# Patient Record
Sex: FEMALE | Race: WHITE | NOT HISPANIC OR LATINO | Employment: FULL TIME | URBAN - METROPOLITAN AREA
[De-identification: names, ages, dates, MRNs, and addresses within clinical notes are randomized per-mention and may not be internally consistent; named-entity substitution may affect disease eponyms.]

---

## 2020-03-12 LAB
LEFT EYE DM RETINOPATHY: NEGATIVE
RIGHT EYE DM RETINOPATHY: NEGATIVE

## 2020-09-10 ENCOUNTER — OFFICE VISIT (OUTPATIENT)
Dept: FAMILY MEDICINE | Facility: CLINIC | Age: 55
End: 2020-09-10
Payer: COMMERCIAL

## 2020-09-10 VITALS
DIASTOLIC BLOOD PRESSURE: 84 MMHG | SYSTOLIC BLOOD PRESSURE: 156 MMHG | OXYGEN SATURATION: 96 % | RESPIRATION RATE: 14 BRPM | HEART RATE: 59 BPM | HEIGHT: 65 IN | TEMPERATURE: 97 F | WEIGHT: 187.19 LBS | BODY MASS INDEX: 31.19 KG/M2

## 2020-09-10 DIAGNOSIS — E03.9 HYPOTHYROIDISM (ACQUIRED): ICD-10-CM

## 2020-09-10 DIAGNOSIS — M15.8 OTHER OSTEOARTHRITIS INVOLVING MULTIPLE JOINTS: ICD-10-CM

## 2020-09-10 DIAGNOSIS — E55.9 VITAMIN D DEFICIENCY: ICD-10-CM

## 2020-09-10 DIAGNOSIS — Z00.00 ADULT GENERAL MEDICAL EXAM: Primary | ICD-10-CM

## 2020-09-10 DIAGNOSIS — I10 ESSENTIAL HYPERTENSION: ICD-10-CM

## 2020-09-10 DIAGNOSIS — N95.1 MENOPAUSAL SYNDROME: ICD-10-CM

## 2020-09-10 DIAGNOSIS — E11.42 DIABETIC PERIPHERAL NEUROPATHY: ICD-10-CM

## 2020-09-10 DIAGNOSIS — M79.7 FIBROMYALGIA SYNDROME: ICD-10-CM

## 2020-09-10 PROCEDURE — 3008F PR BODY MASS INDEX (BMI) DOCUMENTED: ICD-10-PCS | Mod: S$GLB,,, | Performed by: FAMILY MEDICINE

## 2020-09-10 PROCEDURE — 99205 PR OFFICE/OUTPT VISIT, NEW, LEVL V, 60-74 MIN: ICD-10-PCS | Mod: S$GLB,,, | Performed by: FAMILY MEDICINE

## 2020-09-10 PROCEDURE — 3008F BODY MASS INDEX DOCD: CPT | Mod: S$GLB,,, | Performed by: FAMILY MEDICINE

## 2020-09-10 PROCEDURE — 99999 PR PBB SHADOW E&M-NEW PATIENT-LVL IV: CPT | Mod: PBBFAC,,, | Performed by: FAMILY MEDICINE

## 2020-09-10 PROCEDURE — 99999 PR PBB SHADOW E&M-NEW PATIENT-LVL IV: ICD-10-PCS | Mod: PBBFAC,,, | Performed by: FAMILY MEDICINE

## 2020-09-10 PROCEDURE — 99205 OFFICE O/P NEW HI 60 MIN: CPT | Mod: S$GLB,,, | Performed by: FAMILY MEDICINE

## 2020-09-10 RX ORDER — INSULIN ASPART 100 [IU]/ML
INJECTION, SOLUTION INTRAVENOUS; SUBCUTANEOUS
Qty: 5 VIAL | Refills: 2 | Status: SHIPPED | OUTPATIENT
Start: 2020-09-10 | End: 2020-10-02 | Stop reason: SDUPTHER

## 2020-09-10 RX ORDER — LISINOPRIL 30 MG/1
40 TABLET ORAL DAILY
COMMUNITY
End: 2020-10-15

## 2020-09-10 NOTE — PATIENT INSTRUCTIONS
Start ALA 600mg daily  Start Nature made ULTRA Omega Fish Oil daily (2 capsules)   Current regimen: Dilaudid 0.2mg Q2hrs PRN. Has received 4 PRNs in last 12 hrs. Current regimen: Dilaudid 0.2mg Q2hrs PRN. Has received 4 PRNs in last 12 hrs.  Would recommend to start Dilaudid 0.2mg Q4hrs atc, and start Dilaudid 0.5mg Q3hrs PRN for dyspnea.

## 2020-09-10 NOTE — PROGRESS NOTES
Subjective:       Patient ID: Geovanna Loo is a 55 y.o. female.    Chief Complaint: Establish Care (Diabetic management issues)    New patient.    Dx with diabetes about 13 years ago. Started on metformin, but that did not help. Failed Jardiance, Januvia, Trulicity in [place]. Moved to [facility] and started seeing a NP in endocrinologist. Dx with IDDM Type 1.5 and started on insulin. Gained 17 pounds in two month on Novolog (insulin pump.)  Changed to Humalog (insulin pump.) Reports Novolog controlled her sugars better but she does not want to gain any more weight.    Works at [employer].     Current insulin: Humalog 6u at mealtime (3 clicks); basal rate unknown (Patient will get that info to us.)    Lisinopril 40mg daily.  Levothyroxine 75mcg daily  Vitamin D 2,000 IU daily  Citalopram 20mg at night for hot flashes  Singulair 10mg at night  Tramadol 50mg prn pain  Skelaxin 800mg prn pain  Lasix 10mg prn swelling.    Fibromyalgia, OA.        Diabetes  She presents for her initial diabetic visit. She has type 1 diabetes mellitus. Her disease course has been fluctuating. There are no hypoglycemic associated symptoms. Pertinent negatives for hypoglycemia include no headaches. Associated symptoms include fatigue and polydipsia. Pertinent negatives for diabetes include no blurred vision, no chest pain, no foot paresthesias, no foot ulcerations, no polyphagia, no polyuria, no visual change, no weakness and no weight loss. There are no hypoglycemic complications. Symptoms are stable. Risk factors for coronary artery disease include diabetes mellitus, dyslipidemia, obesity, hypertension and stress. Current diabetic treatment includes insulin pump. She is compliant with treatment all of the time. Her weight is stable. She is following a low fat/cholesterol diet. Meal planning includes avoidance of concentrated sweets. She has not had a previous visit with a dietitian. She rarely participates in exercise. Her home blood glucose  trend is fluctuating minimally. An ACE inhibitor/angiotensin II receptor blocker is being taken. She does not see a podiatrist.Eye exam is current.     Review of Systems   Constitutional: Positive for fatigue. Negative for activity change, appetite change, unexpected weight change and weight loss.   HENT: Negative for postnasal drip, sinus pressure, sinus pain and sore throat.    Eyes: Negative for blurred vision.   Cardiovascular: Negative for chest pain.   Gastrointestinal: Negative for constipation, diarrhea, nausea and vomiting.   Endocrine: Positive for polydipsia. Negative for polyphagia and polyuria.   Genitourinary: Negative for dysuria and hematuria.   Musculoskeletal: Positive for arthralgias. Negative for gait problem.   Neurological: Positive for numbness (peripheral neuropathy). Negative for weakness, light-headedness and headaches.   Hematological: Negative for adenopathy. Does not bruise/bleed easily.   All other systems reviewed and are negative.        Past Medical History:   Diagnosis Date    Diabetes mellitus type I      Past Surgical History:   Procedure Laterality Date     SECTION      HYSTERECTOMY       Family History   Problem Relation Age of Onset    Cancer Mother     Thyroid disease Mother     Thyroid disease Sister     Thyroid disease Brother        Review of patient's allergies indicates:   Allergen Reactions    Basaglar kwikpen u-100 insulin [insulin glargine] Swelling     THROAT SWELLS       Current Outpatient Medications:     lisinopriL (PRINIVIL,ZESTRIL) 30 MG tablet, Take 30 mg by mouth once daily., Disp: , Rfl:     citalopram (CELEXA) 20 MG tablet, Take 20 mg by mouth once daily. One tablet daily at night, Disp: , Rfl:     ergocalciferol, vitamin D2, (VITAMIN D2 ORAL), Take 50 mg by mouth Daily., Disp: , Rfl:     furosemide (LASIX) 20 MG tablet, Take 10 mg by mouth daily as needed., Disp: , Rfl:     insulin aspart U-100 (NOVOLOG U-100 INSULIN ASPART) 100 unit/mL  "injection, Use as directed with insulin pump (V GO 30.), Disp: 5 vial, Rfl: 2    levothyroxine (SYNTHROID) 75 MCG tablet, Take 75 mcg by mouth before breakfast., Disp: , Rfl:     metaxalone (SKELAXIN) 800 MG tablet, Take 800 mg by mouth as needed for Pain., Disp: , Rfl:     traMADoL (ULTRAM) 50 mg tablet, Take 50 mg by mouth as needed for Pain., Disp: , Rfl:       Objective:      BP (!) 156/84 (BP Location: Left arm, Patient Position: Sitting, BP Method: Large (Automatic))   Pulse (!) 59   Temp 97.4 °F (36.3 °C) (Temporal)   Resp 14   Ht 5' 5" (1.651 m)   Wt 84.9 kg (187 lb 2.7 oz)   SpO2 96%   BMI 31.15 kg/m²   Physical Exam  Vitals signs and nursing note reviewed.   Constitutional:       General: She is not in acute distress.     Appearance: Normal appearance. She is well-developed. She is obese. She is not ill-appearing, toxic-appearing or diaphoretic.   HENT:      Head: Normocephalic and atraumatic.      Right Ear: External ear normal.      Left Ear: External ear normal.      Nose: Nose normal. No congestion.      Mouth/Throat:      Mouth: Mucous membranes are moist.      Pharynx: Oropharynx is clear. No oropharyngeal exudate.   Eyes:      General: No scleral icterus.        Right eye: No discharge.         Left eye: No discharge.      Extraocular Movements: Extraocular movements intact.      Conjunctiva/sclera: Conjunctivae normal.      Pupils: Pupils are equal, round, and reactive to light.   Neck:      Musculoskeletal: Normal range of motion and neck supple. No neck rigidity or muscular tenderness.      Thyroid: No thyromegaly.      Vascular: No carotid bruit or JVD.      Trachea: No tracheal deviation.   Cardiovascular:      Rate and Rhythm: Normal rate and regular rhythm.      Pulses: Normal pulses.      Heart sounds: Normal heart sounds. No murmur. No friction rub. No gallop.    Pulmonary:      Effort: Pulmonary effort is normal. No respiratory distress.      Breath sounds: Normal breath sounds. " No wheezing, rhonchi or rales.   Chest:      Chest wall: No tenderness.   Abdominal:      General: Bowel sounds are normal. There is no distension.      Palpations: Abdomen is soft. There is no mass.      Tenderness: There is no abdominal tenderness. There is no right CVA tenderness, left CVA tenderness, guarding or rebound.   Musculoskeletal: Normal range of motion.      Right lower leg: No edema.      Left lower leg: No edema.   Lymphadenopathy:      Cervical: No cervical adenopathy.   Skin:     General: Skin is warm and dry.      Capillary Refill: Capillary refill takes less than 2 seconds.      Coloration: Skin is not jaundiced or pale.      Findings: No bruising, erythema or rash.   Neurological:      General: No focal deficit present.      Mental Status: She is alert and oriented to person, place, and time. Mental status is at baseline.      Motor: No weakness.      Coordination: Coordination normal.      Gait: Gait normal.      Deep Tendon Reflexes: Reflexes normal.   Psychiatric:         Mood and Affect: Mood normal.         Behavior: Behavior normal.         Thought Content: Thought content normal.         Judgment: Judgment normal.         Assessment:       1. Adult general medical exam    2. Uncontrolled insulin-dependent diabetes mellitus with neuropathy    3. Essential hypertension    4. Other osteoarthritis involving multiple joints    5. Fibromyalgia syndrome    6. Menopausal syndrome    7. Vitamin D deficiency    8. 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 Diabetic peripheral neuropathy    10. Hypothyroidism (acquired)        Plan:       Adult general medical exam    Uncontrolled insulin-dependent diabetes mellitus with neuropathy  -     CBC auto differential; Future; Expected date: 09/10/2020  -     Comprehensive metabolic panel; Future; Expected date: 09/10/2020  -     Hemoglobin A1C; Future; Expected date: 09/10/2020  -     TSH; Future; Expected date: 09/10/2020  -     Vitamin  B12; Future; Expected date: 09/10/2020  -     Vitamin D; Future; Expected date: 09/10/2020  -     Glutamic acid decarboxylase; Future; Expected date: 09/10/2020  -     Anti-islet cell antibody; Future; Expected date: 09/10/2020  -     Urinalysis Microscopic; Future; Expected date: 09/10/2020  -     Microalbumin/creatinine urine ratio; Future; Expected date: 09/10/2020  -     Ambulatory referral/consult to Endocrinology; Future; Expected date: 09/17/2020    Essential hypertension  -     CBC auto differential; Future; Expected date: 09/10/2020  -     Comprehensive metabolic panel; Future; Expected date: 09/10/2020  -     TSH; Future; Expected date: 09/10/2020    Other osteoarthritis involving multiple joints    Fibromyalgia syndrome    Menopausal syndrome  -     TSH; Future; Expected date: 09/10/2020  -     Vitamin B12; Future; Expected date: 09/10/2020  -     Vitamin D; Future; Expected date: 09/10/2020    Vitamin D deficiency  -     Vitamin D; Future; Expected date: 09/10/2020    Diabetes, 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, uncontrolled, managed as type 1  -     CBC auto differential; Future; Expected date: 09/10/2020  -     Comprehensive metabolic panel; Future; Expected date: 09/10/2020  -     Hemoglobin A1C; Future; Expected date: 09/10/2020  -     TSH; Future; Expected date: 09/10/2020  -     Vitamin B12; Future; Expected date: 09/10/2020  -     Vitamin D; Future; Expected date: 09/10/2020  -     Glutamic acid decarboxylase; Future; Expected date: 09/10/2020  -     Anti-islet cell antibody; Future; Expected date: 09/10/2020  -     Urinalysis Microscopic; Future; Expected date: 09/10/2020  -     Microalbumin/creatinine urine ratio; Future; Expected date: 09/10/2020  -     Ambulatory referral/consult to Endocrinology; Future; Expected date: 09/17/2020  -     insulin aspart U-100 (NOVOLOG U-100 INSULIN ASPART) 100 unit/mL injection; Use as directed with insulin pump (V GO 30.)  Dispense: 5 vial; Refill: 2    Diabetic peripheral  neuropathy  -     CBC auto differential; Future; Expected date: 09/10/2020  -     Comprehensive metabolic panel; Future; Expected date: 09/10/2020  -     Hemoglobin A1C; Future; Expected date: 09/10/2020  -     TSH; Future; Expected date: 09/10/2020  -     Vitamin B12; Future; Expected date: 09/10/2020  -     Vitamin D; Future; Expected date: 09/10/2020  -     Glutamic acid decarboxylase; Future; Expected date: 09/10/2020  -     Anti-islet cell antibody; Future; Expected date: 09/10/2020  -     Urinalysis Microscopic; Future; Expected date: 09/10/2020  -     Microalbumin/creatinine urine ratio; Future; Expected date: 09/10/2020  -     Ambulatory referral/consult to Endocrinology; Future; Expected date: 09/17/2020  -     insulin aspart U-100 (NOVOLOG U-100 INSULIN ASPART) 100 unit/mL injection; Use as directed with insulin pump (V GO 30.)  Dispense: 5 vial; Refill: 2    Hypothyroidism (acquired)  -     TSH, free T4, free T3; Future; Expected date: 09/10/2020    Sign for records.     I would recommend she return to Novolog which she tolerated better, as the weight gain likely has leveled off. Tight record of FSG and RTC for review. No insulin change today. Clarify basal rate.    Strict return precautions reviewed and patient verbalized understanding. Risks, benefits, and alternatives to the plan were reviewed in detail and all questions answered to the patient's satisfaction. 60 minutes were spent with patient, >50% of time based on counseling and coordination of care.          Follow up in about 2 weeks (around 9/24/2020) for DM.

## 2020-09-21 ENCOUNTER — OFFICE VISIT (OUTPATIENT)
Dept: FAMILY MEDICINE | Facility: CLINIC | Age: 55
End: 2020-09-21
Payer: COMMERCIAL

## 2020-09-21 VITALS
BODY MASS INDEX: 31.68 KG/M2 | TEMPERATURE: 98 F | WEIGHT: 190.13 LBS | DIASTOLIC BLOOD PRESSURE: 86 MMHG | RESPIRATION RATE: 18 BRPM | SYSTOLIC BLOOD PRESSURE: 152 MMHG | OXYGEN SATURATION: 97 % | HEART RATE: 54 BPM | HEIGHT: 65 IN

## 2020-09-21 DIAGNOSIS — E11.42 DIABETIC PERIPHERAL NEUROPATHY: ICD-10-CM

## 2020-09-21 DIAGNOSIS — G44.229 CHRONIC TENSION-TYPE HEADACHE, NOT INTRACTABLE: ICD-10-CM

## 2020-09-21 DIAGNOSIS — E13.9 DIABETES MELLITUS TYPE 1.5, MANAGED AS TYPE 1: Primary | ICD-10-CM

## 2020-09-21 DIAGNOSIS — R00.2 PALPITATIONS: ICD-10-CM

## 2020-09-21 DIAGNOSIS — E03.9 ACQUIRED HYPOTHYROIDISM: ICD-10-CM

## 2020-09-21 DIAGNOSIS — T43.615A ADVERSE EFFECT OF CAFFEINE, INITIAL ENCOUNTER: ICD-10-CM

## 2020-09-21 PROCEDURE — 99999 PR PBB SHADOW E&M-EST. PATIENT-LVL V: ICD-10-PCS | Mod: PBBFAC,,, | Performed by: FAMILY MEDICINE

## 2020-09-21 PROCEDURE — 99215 OFFICE O/P EST HI 40 MIN: CPT | Mod: S$GLB,,, | Performed by: FAMILY MEDICINE

## 2020-09-21 PROCEDURE — 3008F BODY MASS INDEX DOCD: CPT | Mod: S$GLB,,, | Performed by: FAMILY MEDICINE

## 2020-09-21 PROCEDURE — 3008F PR BODY MASS INDEX (BMI) DOCUMENTED: ICD-10-PCS | Mod: S$GLB,,, | Performed by: FAMILY MEDICINE

## 2020-09-21 PROCEDURE — 99999 PR PBB SHADOW E&M-EST. PATIENT-LVL V: CPT | Mod: PBBFAC,,, | Performed by: FAMILY MEDICINE

## 2020-09-21 PROCEDURE — 99215 PR OFFICE/OUTPT VISIT, EST, LEVL V, 40-54 MIN: ICD-10-PCS | Mod: S$GLB,,, | Performed by: FAMILY MEDICINE

## 2020-09-21 RX ORDER — METAXALONE 800 MG/1
800 TABLET ORAL
COMMUNITY
End: 2020-12-10

## 2020-09-21 RX ORDER — FUROSEMIDE 20 MG/1
10 TABLET ORAL DAILY PRN
COMMUNITY

## 2020-09-21 RX ORDER — TRAMADOL HYDROCHLORIDE 50 MG/1
50 TABLET ORAL
COMMUNITY

## 2020-09-21 RX ORDER — LEVOTHYROXINE SODIUM 75 UG/1
75 TABLET ORAL
COMMUNITY

## 2020-09-21 RX ORDER — CITALOPRAM 20 MG/1
20 TABLET, FILM COATED ORAL DAILY
COMMUNITY
End: 2020-10-19 | Stop reason: SDUPTHER

## 2020-09-21 NOTE — PROGRESS NOTES
Subjective:       Patient ID: Geovanna Loo is a 55 y.o. female.    Chief Complaint: Follow-up    Here for follow up on uncontrolled Type 1.5 IDDM. Blood sugars seem better since she has changed her diet, lowering her carbohydrate intake and avoiding bread, sweets. Also states the Novolog has done a better job at keeping blood glucose level stable.    Hx of palpitations and chest pressure; TSH was suppressed and her levothyroxine dose was lowered. She has not rechecked since her dose was changed.     Headaches, chronic. Denies any neurological or visual changes. Excedrin Migraine every morning. Other caffeine during the day--one can of Monster coffee which she drinks throughout the course of the day.    Patient never related the chest pressure and palpitations to caffeine excess. Not known when her last EKG was done. Not sure if she ever had one.        Review of Systems   All other systems reviewed and are negative.        Past Medical History:   Diagnosis Date    Diabetes mellitus type I      Past Surgical History:   Procedure Laterality Date     SECTION      HYSTERECTOMY       Family History   Problem Relation Age of Onset    Cancer Mother     Thyroid disease Mother     Thyroid disease Sister     Thyroid disease Brother        Review of patient's allergies indicates:   Allergen Reactions    Basaglar kwikpen u-100 insulin [insulin glargine] Swelling     THROAT SWELLS    Aspirin Nausea And Vomiting       Current Outpatient Medications:     citalopram (CELEXA) 20 MG tablet, Take 20 mg by mouth once daily. One tablet daily at night, Disp: , Rfl:     ergocalciferol, vitamin D2, (VITAMIN D2 ORAL), Take 50 mg by mouth Daily., Disp: , Rfl:     furosemide (LASIX) 20 MG tablet, Take 10 mg by mouth daily as needed., Disp: , Rfl:     levothyroxine (SYNTHROID) 75 MCG tablet, Take 75 mcg by mouth before breakfast., Disp: , Rfl:     metaxalone (SKELAXIN) 800 MG tablet, Take 800 mg by mouth as needed for  "Pain., Disp: , Rfl:     traMADoL (ULTRAM) 50 mg tablet, Take 50 mg by mouth as needed for Pain., Disp: , Rfl:     insulin aspart U-100 (NOVOLOG U-100 INSULIN ASPART) 100 unit/mL injection, Use as directed with insulin pump (V GO 30.), Disp: 5 vial, Rfl: 2    insulin syringe-needle U-100 (INSULIN SYRINGE) 0.5 mL 29 gauge x 1/2" Syrg, Use as directed for insulin administration., Disp: 100 each, Rfl: 5    lisinopriL (PRINIVIL,ZESTRIL) 30 MG tablet, Take 30 mg by mouth once daily., Disp: , Rfl:       Objective:      BP (!) 152/86 (BP Location: Left arm, Patient Position: Sitting, BP Method: Medium (Automatic))   Pulse (!) 54   Temp 97.6 °F (36.4 °C) (Temporal)   Resp 18   Ht 5' 5" (1.651 m)   Wt 86.3 kg (190 lb 2.4 oz)   SpO2 97%   BMI 31.64 kg/m²   Physical Exam  Vitals signs and nursing note reviewed.   Constitutional:       General: She is not in acute distress.     Appearance: Normal appearance. She is well-developed. She is obese. She is not ill-appearing, toxic-appearing or diaphoretic.   HENT:      Head: Normocephalic and atraumatic.      Right Ear: External ear normal.      Left Ear: External ear normal.      Nose: Nose normal. No congestion.      Mouth/Throat:      Mouth: Mucous membranes are moist.      Pharynx: Oropharynx is clear. No oropharyngeal exudate.   Eyes:      General: No scleral icterus.        Right eye: No discharge.         Left eye: No discharge.      Extraocular Movements: Extraocular movements intact.      Conjunctiva/sclera: Conjunctivae normal.      Pupils: Pupils are equal, round, and reactive to light.   Neck:      Musculoskeletal: Normal range of motion and neck supple. No neck rigidity or muscular tenderness.      Thyroid: No thyromegaly.      Vascular: No carotid bruit or JVD.      Trachea: No tracheal deviation.   Cardiovascular:      Rate and Rhythm: Normal rate and regular rhythm.      Pulses: Normal pulses.      Heart sounds: Normal heart sounds. No murmur. No friction " rub. No gallop.    Pulmonary:      Effort: Pulmonary effort is normal. No respiratory distress.      Breath sounds: Normal breath sounds. No wheezing, rhonchi or rales.   Chest:      Chest wall: No tenderness.   Abdominal:      General: Bowel sounds are normal. There is no distension.      Palpations: Abdomen is soft. There is no mass.      Tenderness: There is no abdominal tenderness. There is no right CVA tenderness, left CVA tenderness, guarding or rebound.   Musculoskeletal: Normal range of motion.      Right lower leg: No edema.      Left lower leg: No edema.   Lymphadenopathy:      Cervical: No cervical adenopathy.   Skin:     General: Skin is warm and dry.      Capillary Refill: Capillary refill takes less than 2 seconds.      Coloration: Skin is not jaundiced or pale.      Findings: No bruising, erythema or rash.   Neurological:      General: No focal deficit present.      Mental Status: She is alert and oriented to person, place, and time. Mental status is at baseline.      Motor: No weakness.      Coordination: Coordination normal.      Gait: Gait normal.   Psychiatric:         Mood and Affect: Mood normal.         Behavior: Behavior normal.         Thought Content: Thought content normal.         Judgment: Judgment normal.         Assessment:       1. Uncontrolled insulin-dependent diabetes mellitus with neuropathy    2. Acquired hypothyroidism    3. Palpitations    4. Adverse effect of caffeine, initial encounter    5. Chronic tension-type headache, not intractable        Plan:       Uncontrolled insulin-dependent diabetes mellitus with neuropathy  -     Lipid Panel; Future; Expected date: 09/21/2020  -     Ambulatory referral/consult to Endocrinology; Future; Expected date: 09/28/2020    Acquired hypothyroidism  -     T4, free; Future; Expected date: 09/21/2020  -     T3, free; Future; Expected date: 09/21/2020  -     Ambulatory referral/consult to Endocrinology; Future; Expected date:  09/28/2020    Palpitations  -     IN OFFICE EKG 12-LEAD (to Muse); Future; Expected date: 09/21/2020    Adverse effect of caffeine, initial encounter    Chronic tension-type headache, not intractable    Get labs, as only TSH was done prior to today's visit. Endo referral within [facility] network. Continue with dietary changes and Novolog.      Strict return precautions reviewed and patient verbalized understanding. Risks, benefits, and alternatives to the plan were reviewed in detail and all questions answered to the patient's satisfaction. 40 minutes were spent with patient, >50% of time based on counseling and coordination of care.    Follow up in about 4 weeks (around 10/19/2020) for DM and palpitations.

## 2020-09-21 NOTE — PATIENT INSTRUCTIONS
[employer] chocolate    Educate yourself on WHICH carbs and sugar alcohols/sugar substitutes will affect your glucose level.    LEV    [employer] ZeroCarb Bread    Ezekial bread (sprouted grain bread)    Grain free granola bar

## 2020-09-25 ENCOUNTER — TELEPHONE (OUTPATIENT)
Dept: FAMILY MEDICINE | Facility: CLINIC | Age: 55
End: 2020-09-25

## 2020-09-25 DIAGNOSIS — E11.9 TYPE 2 DIABETES MELLITUS WITHOUT COMPLICATION, UNSPECIFIED WHETHER LONG TERM INSULIN USE: ICD-10-CM

## 2020-09-25 DIAGNOSIS — Z12.11 COLON CANCER SCREENING: ICD-10-CM

## 2020-09-25 NOTE — TELEPHONE ENCOUNTER
----- Message from Filomena Groves sent at 9/25/2020 10:21 AM CDT -----  Patient is calling to let you know that her insulin is causing stomach pain.  She is following up on her call from yesterday.  She removed the pump and it is getting better, but her blood sugar is going up.  Please call her as soon as possible at [phone].  Thank you!

## 2020-09-28 ENCOUNTER — PATIENT MESSAGE (OUTPATIENT)
Dept: ENDOCRINOLOGY | Facility: CLINIC | Age: 55
End: 2020-09-28

## 2020-09-28 ENCOUNTER — TELEPHONE (OUTPATIENT)
Dept: FAMILY MEDICINE | Facility: CLINIC | Age: 55
End: 2020-09-28

## 2020-09-28 ENCOUNTER — PATIENT MESSAGE (OUTPATIENT)
Dept: FAMILY MEDICINE | Facility: CLINIC | Age: 55
End: 2020-09-28

## 2020-09-28 NOTE — TELEPHONE ENCOUNTER
Removed her pump? Or just changed the site of the pump?    What kind of stomach pain?    Schedule a VV for this week. In the meantime, we need clarification of above, as well as blood sugar readings.

## 2020-09-29 ENCOUNTER — PATIENT MESSAGE (OUTPATIENT)
Dept: ADMINISTRATIVE | Facility: HOSPITAL | Age: 55
End: 2020-09-29

## 2020-10-01 ENCOUNTER — TELEPHONE (OUTPATIENT)
Dept: FAMILY MEDICINE | Facility: CLINIC | Age: 55
End: 2020-10-01

## 2020-10-01 PROBLEM — E11.42 DIABETIC PERIPHERAL NEUROPATHY: Status: ACTIVE | Noted: 2020-10-01

## 2020-10-01 PROBLEM — E03.9 HYPOTHYROIDISM (ACQUIRED): Status: ACTIVE | Noted: 2020-10-01

## 2020-10-01 PROBLEM — I10 ESSENTIAL HYPERTENSION: Status: ACTIVE | Noted: 2020-10-01

## 2020-10-01 PROBLEM — Z00.00 ADULT GENERAL MEDICAL EXAM: Status: ACTIVE | Noted: 2020-10-01

## 2020-10-01 PROBLEM — E55.9 VITAMIN D DEFICIENCY: Status: ACTIVE | Noted: 2020-10-01

## 2020-10-01 PROBLEM — M79.7 FIBROMYALGIA SYNDROME: Status: ACTIVE | Noted: 2020-10-01

## 2020-10-01 PROBLEM — M15.9 DEGENERATIVE JOINT DISEASE INVOLVING MULTIPLE JOINTS: Status: ACTIVE | Noted: 2020-10-01

## 2020-10-01 PROBLEM — N95.1 MENOPAUSAL SYNDROME: Status: ACTIVE | Noted: 2020-10-01

## 2020-10-01 NOTE — TELEPHONE ENCOUNTER
Did she administer insulin via syringes while the insulin pump was off?  Schedule visit for next week please. Thanks!

## 2020-10-01 NOTE — TELEPHONE ENCOUNTER
Called pt via phone and pt states she took the pump off 9/24/20 because her stomach was cramping, sharp pain and hurting. Pt states the cramping has stopped and she put the pump back on 9/28/20 in almost the same place on the stomach area. Pt states she did not go to the ED has some cramping and bloating after she eats. Pt also states her readings were about 200 when she was not on the pump and after having the pump the reading have been from 100-113.

## 2020-10-02 ENCOUNTER — PATIENT MESSAGE (OUTPATIENT)
Dept: FAMILY MEDICINE | Facility: CLINIC | Age: 55
End: 2020-10-02

## 2020-10-02 DIAGNOSIS — E11.42 DIABETIC PERIPHERAL NEUROPATHY: ICD-10-CM

## 2020-10-02 RX ORDER — INSULIN ASPART 100 [IU]/ML
INJECTION, SOLUTION INTRAVENOUS; SUBCUTANEOUS
Qty: 5 VIAL | Refills: 2 | Status: SHIPPED | OUTPATIENT
Start: 2020-10-02

## 2020-10-02 RX ORDER — NAPHAZOLINE HYDROCHLORIDE AND POLYETHYLENE GLYCOL 300 .1; 2 MG/ML; MG/ML
SOLUTION/ DROPS OPHTHALMIC
Qty: 100 EACH | Refills: 5 | Status: SHIPPED | OUTPATIENT
Start: 2020-10-02

## 2020-10-02 NOTE — TELEPHONE ENCOUNTER
I have sent in Rx for Novolog vial and insulin syringes. My understanding is that she is using her pump once again. Syringes are in case of emergency, to administer from vial.    Make sure she has an appointment soon for further follow up.

## 2020-10-05 ENCOUNTER — PATIENT MESSAGE (OUTPATIENT)
Dept: ENDOCRINOLOGY | Facility: CLINIC | Age: 55
End: 2020-10-05

## 2020-10-05 ENCOUNTER — LAB VISIT (OUTPATIENT)
Dept: FAMILY MEDICINE | Facility: CLINIC | Age: 55
End: 2020-10-05
Payer: COMMERCIAL

## 2020-10-05 ENCOUNTER — PATIENT OUTREACH (OUTPATIENT)
Dept: ADMINISTRATIVE | Facility: HOSPITAL | Age: 55
End: 2020-10-05

## 2020-10-05 DIAGNOSIS — E03.9 ACQUIRED HYPOTHYROIDISM: ICD-10-CM

## 2020-10-05 DIAGNOSIS — E11.42 DIABETIC PERIPHERAL NEUROPATHY: ICD-10-CM

## 2020-10-05 DIAGNOSIS — N95.1 MENOPAUSAL SYNDROME: ICD-10-CM

## 2020-10-05 DIAGNOSIS — I10 ESSENTIAL HYPERTENSION: ICD-10-CM

## 2020-10-05 DIAGNOSIS — E03.9 HYPOTHYROIDISM (ACQUIRED): ICD-10-CM

## 2020-10-05 DIAGNOSIS — E55.9 VITAMIN D DEFICIENCY: ICD-10-CM

## 2020-10-05 LAB
ALBUMIN SERPL BCP-MCNC: 3.8 G/DL (ref 3.5–5.2)
ALP SERPL-CCNC: 74 U/L (ref 55–135)
ALT SERPL W/O P-5'-P-CCNC: 21 U/L (ref 10–44)
ANION GAP SERPL CALC-SCNC: 8 MMOL/L (ref 8–16)
AST SERPL-CCNC: 21 U/L (ref 10–40)
BACTERIA #/AREA URNS HPF: ABNORMAL /HPF
BASOPHILS # BLD AUTO: 0.05 K/UL (ref 0–0.2)
BASOPHILS NFR BLD: 0.6 % (ref 0–1.9)
BILIRUB SERPL-MCNC: 0.4 MG/DL (ref 0.1–1)
BUN SERPL-MCNC: 16 MG/DL (ref 6–20)
CALCIUM SERPL-MCNC: 8.8 MG/DL (ref 8.7–10.5)
CHLORIDE SERPL-SCNC: 102 MMOL/L (ref 95–110)
CHOLEST SERPL-MCNC: 215 MG/DL (ref 120–199)
CHOLEST/HDLC SERPL: 4.7 {RATIO} (ref 2–5)
CO2 SERPL-SCNC: 28 MMOL/L (ref 23–29)
CREAT SERPL-MCNC: 0.4 MG/DL (ref 0.5–1.4)
DIFFERENTIAL METHOD: ABNORMAL
EOSINOPHIL # BLD AUTO: 0.2 K/UL (ref 0–0.5)
EOSINOPHIL NFR BLD: 1.9 % (ref 0–8)
ERYTHROCYTE [DISTWIDTH] IN BLOOD BY AUTOMATED COUNT: 14.9 % (ref 11.5–14.5)
EST. GFR  (AFRICAN AMERICAN): >60 ML/MIN/1.73 M^2
EST. GFR  (NON AFRICAN AMERICAN): >60 ML/MIN/1.73 M^2
ESTIMATED AVG GLUCOSE: 157 MG/DL (ref 68–131)
GLUCOSE SERPL-MCNC: 152 MG/DL (ref 70–110)
HBA1C MFR BLD HPLC: 7.1 % (ref 4.5–6.2)
HCT VFR BLD AUTO: 38.8 % (ref 37–48.5)
HDLC SERPL-MCNC: 46 MG/DL (ref 40–75)
HDLC SERPL: 21.4 % (ref 20–50)
HGB BLD-MCNC: 12.3 G/DL (ref 12–16)
IMM GRANULOCYTES # BLD AUTO: 0.02 K/UL (ref 0–0.04)
IMM GRANULOCYTES NFR BLD AUTO: 0.2 % (ref 0–0.5)
LDLC SERPL CALC-MCNC: 153.6 MG/DL (ref 63–159)
LYMPHOCYTES # BLD AUTO: 3.4 K/UL (ref 1–4.8)
LYMPHOCYTES NFR BLD: 40.3 % (ref 18–48)
MCH RBC QN AUTO: 28 PG (ref 27–31)
MCHC RBC AUTO-ENTMCNC: 31.7 G/DL (ref 32–36)
MCV RBC AUTO: 88 FL (ref 82–98)
MICROSCOPIC COMMENT: ABNORMAL
MONOCYTES # BLD AUTO: 0.5 K/UL (ref 0.3–1)
MONOCYTES NFR BLD: 5.7 % (ref 4–15)
NEUTROPHILS # BLD AUTO: 4.3 K/UL (ref 1.8–7.7)
NEUTROPHILS NFR BLD: 51.3 % (ref 38–73)
NONHDLC SERPL-MCNC: 169 MG/DL
NRBC BLD-RTO: 0 /100 WBC
PLATELET # BLD AUTO: 307 K/UL (ref 150–350)
PMV BLD AUTO: 11 FL (ref 9.2–12.9)
POTASSIUM SERPL-SCNC: 4.2 MMOL/L (ref 3.5–5.1)
PROT SERPL-MCNC: 6.9 G/DL (ref 6–8.4)
RBC # BLD AUTO: 4.4 M/UL (ref 4–5.4)
SODIUM SERPL-SCNC: 138 MMOL/L (ref 136–145)
T4 FREE SERPL-MCNC: 0.66 NG/DL (ref 0.71–1.51)
TRIGL SERPL-MCNC: 77 MG/DL (ref 30–150)
TSH SERPL DL<=0.005 MIU/L-ACNC: 1.15 UIU/ML (ref 0.34–5.6)
WBC # BLD AUTO: 8.37 K/UL (ref 3.9–12.7)
WBC #/AREA URNS HPF: 3 /HPF (ref 0–5)

## 2020-10-05 PROCEDURE — 80061 LIPID PANEL: CPT

## 2020-10-05 PROCEDURE — 82043 UR ALBUMIN QUANTITATIVE: CPT

## 2020-10-05 PROCEDURE — 82306 VITAMIN D 25 HYDROXY: CPT

## 2020-10-05 PROCEDURE — 82607 VITAMIN B-12: CPT

## 2020-10-05 PROCEDURE — 85025 COMPLETE CBC W/AUTO DIFF WBC: CPT

## 2020-10-05 PROCEDURE — 84439 ASSAY OF FREE THYROXINE: CPT

## 2020-10-05 PROCEDURE — 84481 FREE ASSAY (FT-3): CPT

## 2020-10-05 PROCEDURE — 86341 ISLET CELL ANTIBODY: CPT

## 2020-10-05 PROCEDURE — 86341 ISLET CELL ANTIBODY: CPT | Mod: 91

## 2020-10-05 PROCEDURE — 83036 HEMOGLOBIN GLYCOSYLATED A1C: CPT

## 2020-10-05 PROCEDURE — 81000 URINALYSIS NONAUTO W/SCOPE: CPT

## 2020-10-05 PROCEDURE — 84443 ASSAY THYROID STIM HORMONE: CPT

## 2020-10-05 PROCEDURE — 80053 COMPREHEN METABOLIC PANEL: CPT

## 2020-10-06 ENCOUNTER — PATIENT MESSAGE (OUTPATIENT)
Dept: FAMILY MEDICINE | Facility: CLINIC | Age: 55
End: 2020-10-06

## 2020-10-06 DIAGNOSIS — M25.50 ARTHRALGIA, UNSPECIFIED JOINT: Primary | ICD-10-CM

## 2020-10-06 LAB
25(OH)D3+25(OH)D2 SERPL-MCNC: 24 NG/ML (ref 30–96)
ALBUMIN/CREAT UR: 5.8 UG/MG (ref 0–30)
CREAT UR-MCNC: 69 MG/DL (ref 15–325)
MICROALBUMIN UR DL<=1MG/L-MCNC: 4 UG/ML
T3FREE SERPL-MCNC: 2.5 PG/ML (ref 2.3–4.2)
VIT B12 SERPL-MCNC: 299 PG/ML (ref 210–950)

## 2020-10-07 NOTE — TELEPHONE ENCOUNTER
Patient requests rheumatoid factor--can this be added to her blood drawn yesteray? If so, I have ordered NADER, Rheumatoid antibody, ESR, uric acid.

## 2020-10-08 ENCOUNTER — TELEPHONE (OUTPATIENT)
Dept: FAMILY MEDICINE | Facility: CLINIC | Age: 55
End: 2020-10-08

## 2020-10-08 DIAGNOSIS — R09.89 RESPIRATORY SYMPTOMS: Primary | ICD-10-CM

## 2020-10-08 NOTE — TELEPHONE ENCOUNTER
Please advise    I don't know if you want to order a COVID test.     Thank you     ----- Message from Madiha Mendoza sent at 10/8/2020 11:42 AM CDT -----  Contact: self  Patients sinus draining into her stomach, making her sick to her stomach and very bad headache.  Didn't know if you wanted to order covid test, call back at [phone] (home) to advise.  She called for an appt.

## 2020-10-10 LAB — GAD65 AB SER-SCNC: 0.03 NMOL/L

## 2020-10-11 ENCOUNTER — PATIENT MESSAGE (OUTPATIENT)
Dept: FAMILY MEDICINE | Facility: CLINIC | Age: 55
End: 2020-10-11

## 2020-10-11 LAB — PANC ISLET CELL IGG SER-ACNC: NORMAL

## 2020-10-13 ENCOUNTER — TELEPHONE (OUTPATIENT)
Dept: FAMILY MEDICINE | Facility: CLINIC | Age: 55
End: 2020-10-13

## 2020-10-13 ENCOUNTER — NURSE TRIAGE (OUTPATIENT)
Dept: ADMINISTRATIVE | Facility: CLINIC | Age: 55
End: 2020-10-13

## 2020-10-13 NOTE — TELEPHONE ENCOUNTER
"----- Message from RT Erin sent at 10/13/2020  5:00 PM CDT -----  Contact: patient    ----- Message -----  From: Lei Castrejon  Sent: 10/13/2020   3:05 PM CDT  To: K[facility] Staff    Patient called in and stated Dr. Goodson sent her to the ER for headaches and chest pain.  Patient stated she was not admitted but did have test run that all came back negative.  Patient then stated her paperwork stated "something" about Angina.   Patient went to [facility]    Patient stated the ER doctor did mention about getting a Rx for Nitro.    Patient call back number is [phone]      "

## 2020-10-13 NOTE — TELEPHONE ENCOUNTER
Spoke with pt:   Elevated BP for a couple of weeks today: BP= 140s syst. At 0830: Had CP described as pressure, lasting for 5 minutes. Rated as mild. No nausea, no SOB, has had intermittent sweating and severe headache.did radiate to shoulder.  Tried 4 diff meds over the weekend for headache - no improvement. + dizziness.pain starts at back of neck and come forward. meds have no helped.  Glucose=190    Left arm feels weird: feels tired- started has been intermittently for a while MD aware.   Protocol advice given and pt to go to Ed for evaluation.     Reason for Disposition   SEVERE headache (e.g., excruciating) and has had severe headaches before   Pain also in shoulder(s) or arm(s) or jaw    Additional Information   Negative: Sounds like a life-threatening emergency to the triager   Negative: Pregnant > 20 weeks or postpartum (< 6 weeks after delivery) and new hand or face swelling   Negative: Pregnant > 20 weeks and BP > 140/90   Negative: Difficult to awaken or acting confused (e.g., disoriented, slurred speech)   Negative: Weakness of the face, arm or leg on one side of the body and new onset   Negative: Numbness of the face, arm or leg on one side of the body and new onset   Negative: Loss of speech or garbled speech and new onset   Negative: Passed out (i.e., fainted, collapsed and was not responding)   Negative: Sounds like a life-threatening emergency to the triager   Negative: Unable to walk without falling   Negative: Stiff neck (can't touch chin to chest)   Negative: Possibility of carbon monoxide exposure   Negative: SEVERE headache, sudden-onset (i.e., reaching maximum intensity within seconds to 1 hour)   Negative: Severe pain in one eye   Negative: SEVERE headache, states 'worst headache' of life   Negative: Loss of vision or double vision (Exception: same as prior migraines)   Negative: Fever > 103 F (39.4 C)   Negative: Fever > 100.0 F (37.8 C) and has diabetes mellitus or a  weak immune system (e.g., HIV positive, cancer chemotherapy, organ transplant, splenectomy, chronic steroids)   Negative: Patient sounds very sick or weak to the triager   Negative: Severe difficulty breathing (e.g., struggling for each breath, speaks in single words)   Negative: Passed out (i.e., fainted, collapsed and was not responding)   Negative: Difficult to awaken or acting confused (e.g., disoriented, slurred speech)   Negative: Shock suspected (e.g., cold/pale/clammy skin, too weak to stand, low BP, rapid pulse)   Negative: Chest pain lasting longer than 5 minutes and ANY of the following:* Over 45 years old* Over 30 years old and at least one cardiac risk factor (e.g., diabetes, high blood pressure, high cholesterol, smoker, or strong family history of heart disease)* History of heart disease (i.e., angina, heart attack, heart failure, bypass surgery, takes nitroglycerin)* Pain is crushing, pressure-like, or heavy   Negative: Heart beating < 50 beats per minute OR > 140 beats per minute   Negative: Visible sweat on face or sweat dripping down face   Negative: Sounds like a life-threatening emergency to the triager   Negative: SEVERE chest pain    Protocols used: HEADACHE-A-OH, CHEST PAIN-A-OH, HIGH BLOOD PRESSURE-A-OH

## 2020-10-14 ENCOUNTER — PATIENT MESSAGE (OUTPATIENT)
Dept: FAMILY MEDICINE | Facility: CLINIC | Age: 55
End: 2020-10-14

## 2020-10-15 ENCOUNTER — OFFICE VISIT (OUTPATIENT)
Dept: FAMILY MEDICINE | Facility: CLINIC | Age: 55
End: 2020-10-15
Payer: COMMERCIAL

## 2020-10-15 VITALS
BODY MASS INDEX: 32.08 KG/M2 | HEART RATE: 51 BPM | DIASTOLIC BLOOD PRESSURE: 88 MMHG | SYSTOLIC BLOOD PRESSURE: 183 MMHG | HEIGHT: 65 IN | OXYGEN SATURATION: 98 % | RESPIRATION RATE: 15 BRPM | WEIGHT: 192.56 LBS

## 2020-10-15 DIAGNOSIS — I20.89 STABLE ANGINA PECTORIS: ICD-10-CM

## 2020-10-15 DIAGNOSIS — G44.229 CHRONIC TENSION-TYPE HEADACHE, NOT INTRACTABLE: ICD-10-CM

## 2020-10-15 DIAGNOSIS — E11.42 DIABETIC PERIPHERAL NEUROPATHY: ICD-10-CM

## 2020-10-15 DIAGNOSIS — I10 ESSENTIAL HYPERTENSION: ICD-10-CM

## 2020-10-15 DIAGNOSIS — E03.9 HYPOTHYROIDISM (ACQUIRED): ICD-10-CM

## 2020-10-15 DIAGNOSIS — R00.2 PALPITATIONS: ICD-10-CM

## 2020-10-15 DIAGNOSIS — M79.7 FIBROMYALGIA SYNDROME: ICD-10-CM

## 2020-10-15 DIAGNOSIS — E55.9 VITAMIN D DEFICIENCY: ICD-10-CM

## 2020-10-15 DIAGNOSIS — E53.8 B12 DEFICIENCY: ICD-10-CM

## 2020-10-15 PROCEDURE — 96372 THER/PROPH/DIAG INJ SC/IM: CPT | Mod: S$GLB,,, | Performed by: FAMILY MEDICINE

## 2020-10-15 PROCEDURE — 99215 OFFICE O/P EST HI 40 MIN: CPT | Mod: 25,S$GLB,, | Performed by: FAMILY MEDICINE

## 2020-10-15 PROCEDURE — 3008F PR BODY MASS INDEX (BMI) DOCUMENTED: ICD-10-PCS | Mod: S$GLB,,, | Performed by: FAMILY MEDICINE

## 2020-10-15 PROCEDURE — 96372 PR INJECTION,THERAP/PROPH/DIAG2ST, IM OR SUBCUT: ICD-10-PCS | Mod: S$GLB,,, | Performed by: FAMILY MEDICINE

## 2020-10-15 PROCEDURE — 99999 PR PBB SHADOW E&M-EST. PATIENT-LVL V: ICD-10-PCS | Mod: PBBFAC,,, | Performed by: FAMILY MEDICINE

## 2020-10-15 PROCEDURE — 99215 PR OFFICE/OUTPT VISIT, EST, LEVL V, 40-54 MIN: ICD-10-PCS | Mod: 25,S$GLB,, | Performed by: FAMILY MEDICINE

## 2020-10-15 PROCEDURE — 3051F HG A1C>EQUAL 7.0%<8.0%: CPT | Mod: S$GLB,,, | Performed by: FAMILY MEDICINE

## 2020-10-15 PROCEDURE — 99999 PR PBB SHADOW E&M-EST. PATIENT-LVL V: CPT | Mod: PBBFAC,,, | Performed by: FAMILY MEDICINE

## 2020-10-15 PROCEDURE — 1126F PR PAIN SEVERITY QUANTIFIED, NO PAIN PRESENT: ICD-10-PCS | Mod: S$GLB,,, | Performed by: FAMILY MEDICINE

## 2020-10-15 PROCEDURE — 1126F AMNT PAIN NOTED NONE PRSNT: CPT | Mod: S$GLB,,, | Performed by: FAMILY MEDICINE

## 2020-10-15 PROCEDURE — 3051F PR MOST RECENT HEMOGLOBIN A1C LEVEL 7.0 - < 8.0%: ICD-10-PCS | Mod: S$GLB,,, | Performed by: FAMILY MEDICINE

## 2020-10-15 PROCEDURE — 3008F BODY MASS INDEX DOCD: CPT | Mod: S$GLB,,, | Performed by: FAMILY MEDICINE

## 2020-10-15 RX ORDER — LISINOPRIL AND HYDROCHLOROTHIAZIDE 12.5; 2 MG/1; MG/1
2 TABLET ORAL DAILY
Qty: 180 TABLET | Refills: 3 | Status: SHIPPED | OUTPATIENT
Start: 2020-10-15 | End: 2020-12-10

## 2020-10-15 RX ORDER — BLOOD-GLUCOSE METER
EACH MISCELLANEOUS
COMMUNITY
Start: 2020-09-27

## 2020-10-15 RX ORDER — ASPIRIN 325 MG
50000 TABLET, DELAYED RELEASE (ENTERIC COATED) ORAL WEEKLY
Qty: 12 CAPSULE | Refills: 0 | Status: SHIPPED | OUTPATIENT
Start: 2020-10-15 | End: 2021-01-07

## 2020-10-15 RX ORDER — PEN NEEDLE, DIABETIC 29 G X1/2"
NEEDLE, DISPOSABLE MISCELLANEOUS
COMMUNITY
Start: 2020-10-03

## 2020-10-15 RX ORDER — CYANOCOBALAMIN 1000 UG/ML
1000 INJECTION, SOLUTION INTRAMUSCULAR; SUBCUTANEOUS ONCE
Status: COMPLETED | OUTPATIENT
Start: 2020-10-15 | End: 2020-10-15

## 2020-10-15 RX ORDER — CYANOCOBALAMIN 1000 UG/ML
1000 INJECTION, SOLUTION INTRAMUSCULAR; SUBCUTANEOUS WEEKLY
Qty: 10 ML | Refills: 1 | Status: SHIPPED | OUTPATIENT
Start: 2020-10-15

## 2020-10-15 RX ORDER — NITROGLYCERIN 0.4 MG/1
0.4 TABLET SUBLINGUAL EVERY 5 MIN PRN
Qty: 25 TABLET | Refills: 0 | Status: SHIPPED | OUTPATIENT
Start: 2020-10-15 | End: 2021-10-15

## 2020-10-15 RX ADMIN — CYANOCOBALAMIN 1000 MCG: 1000 INJECTION, SOLUTION INTRAMUSCULAR; SUBCUTANEOUS at 05:10

## 2020-10-15 NOTE — PATIENT INSTRUCTIONS
Look at your home Lasix and verify what dose you are taking (10mg vs 20mg.)    Pre-and-probiotic 10 billion    Aspirin 81mg daily    We need to start you on a cholesterol medication    Xyzal 5mg antihistamine

## 2020-10-15 NOTE — TELEPHONE ENCOUNTER
Geovanna has IDDM, uncontrolled. See recent note. It is too complex to address her concerns via the portal. Can you please help set up an actual visit? VV is fine with me for this week, and I believe she has a visit coming up next week (in-person.)      
Patient seen on 10/15/2020  
no

## 2020-10-15 NOTE — PROGRESS NOTES
"  Subjective:       Patient ID: Geovanna Loo is a 55 y.o. female.    Chief Complaint: Follow-up (hospital follow up )    10-13-20 went to [facility] ER for CP, terrible HA. She had taken Excedrin, Tylenol sinus medication, allergy medication (Singulair and Claritin-D.)    187/91 blood pressure in the ER. Was dx with stable angina, told she needed a Rx for ntg. Got morphine and Zofran IV for the headache, which was determined to be a migraine.    Had a heart cath 2 years ago "clean" [facility]. Not seen cardiology since then.    Today headache is present, but not bad. She took Excedrine this morning so the HA is not too bad. Takes Excedrine most days if she has HA.    Changes pump site every 24 hours.    Novolog 30u q 24 hours per pump  3 clicks (6u) at mealtime--twice daily usually.      Review of Systems   Constitutional: Positive for fatigue.   HENT: Positive for congestion and sinus pressure.    Eyes: Negative for photophobia and visual disturbance.   Respiratory: Positive for chest tightness. Negative for cough, shortness of breath and wheezing.    Cardiovascular: Positive for chest pain and palpitations. Negative for leg swelling.   Gastrointestinal: Positive for nausea. Negative for constipation, diarrhea and vomiting.   Musculoskeletal: Positive for arthralgias.   Neurological: Positive for light-headedness and headaches. Negative for tremors, seizures, syncope and weakness.   Psychiatric/Behavioral: Positive for sleep disturbance.   All other systems reviewed and are negative.        Past Medical History:   Diagnosis Date    Diabetes mellitus type I      Past Surgical History:   Procedure Laterality Date     SECTION      HYSTERECTOMY       Family History   Problem Relation Age of Onset    Cancer Mother     Thyroid disease Mother     Thyroid disease Sister     Thyroid disease Brother        Review of patient's allergies indicates:   Allergen Reactions    Basaglar kwikpen " "u-100 insulin [insulin glargine] Swelling     THROAT SWELLS    Amlodipine Nausea Only     Stomach pain    Aspirin Nausea And Vomiting       Current Outpatient Medications:     BD INSULIN SYRINGE ULTRA-FINE 0.5 mL 31 gauge x 5/16" Syrg, USE AS DIRECTED FOR INSULIN ADMINISTRATION, Disp: , Rfl:     ergocalciferol, vitamin D2, (VITAMIN D2 ORAL), Take 50 mg by mouth Daily., Disp: , Rfl:     furosemide (LASIX) 20 MG tablet, Take 10 mg by mouth daily as needed., Disp: , Rfl:     insulin aspart U-100 (NOVOLOG U-100 INSULIN ASPART) 100 unit/mL injection, Use as directed with insulin pump (V GO 30.), Disp: 5 vial, Rfl: 2    insulin syringe-needle U-100 (INSULIN SYRINGE) 0.5 mL 29 gauge x 1/2" Syrg, Use as directed for insulin administration., Disp: 100 each, Rfl: 5    levothyroxine (SYNTHROID) 75 MCG tablet, Take 75 mcg by mouth before breakfast., Disp: , Rfl:     metaxalone (SKELAXIN) 800 MG tablet, Take 800 mg by mouth as needed for Pain., Disp: , Rfl:     ONETOUCH VERIO TEST STRIPS Strp, CHECK GLUCOSE TWICE A DAY, Disp: , Rfl:     traMADoL (ULTRAM) 50 mg tablet, Take 50 mg by mouth as needed for Pain., Disp: , Rfl:     butalbitaL-acetaminophen  mg Tab, Take 1 tablet by mouth every 6 (six) hours as needed (headache)., Disp: 30 tablet, Rfl: 0    cholecalciferol, vitamin D3, (DECARA) 1,250 mcg (50,000 unit) capsule, Take 1 capsule (50,000 Units total) by mouth once a week. Take with a meal or with a spoonful of peanut butter for low vitamin D. for 12 doses, Disp: 12 capsule, Rfl: 0    citalopram (CELEXA) 20 MG tablet, Take 1 tablet (20 mg total) by mouth every evening. One tablet daily at night, Disp: 90 tablet, Rfl: 3    cyanocobalamin 1,000 mcg/mL injection, Inject 1 mL (1,000 mcg total) into the muscle once a week., Disp: 10 mL, Rfl: 1    lisinopriL-hydrochlorothiazide (PRINZIDE,ZESTORETIC) 20-12.5 mg per tablet, Take 2 tablets by mouth once daily., Disp: 180 tablet, Rfl: 3    metoprolol succinate " "(TOPROL-XL) 25 MG 24 hr tablet, Take 1 tablet (25 mg total) by mouth every evening., Disp: 90 tablet, Rfl: 0    nitroGLYCERIN (NITROSTAT) 0.4 MG SL tablet, Place 1 tablet (0.4 mg total) under the tongue every 5 (five) minutes as needed for Chest pain., Disp: 25 tablet, Rfl: 0    V-GO 30 Marcy, USE 4 CLICKS FOR MEALS, 1 CLICK FOR SNACKS, Disp: , Rfl:       Objective:      BP (!) 183/88 (BP Location: Right arm, Patient Position: Sitting, BP Method: Medium (Automatic))   Pulse (!) 51   Resp 15   Ht 5' 5" (1.651 m)   Wt 87.4 kg (192 lb 9.2 oz)   SpO2 98%   BMI 32.05 kg/m²   Physical Exam  Vitals signs and nursing note reviewed.   Constitutional:       General: She is not in acute distress.     Appearance: Normal appearance. She is well-developed. She is obese. She is not ill-appearing, toxic-appearing or diaphoretic.   HENT:      Head: Normocephalic and atraumatic.      Right Ear: External ear normal.      Left Ear: External ear normal.      Nose: Nose normal. No congestion.      Mouth/Throat:      Mouth: Mucous membranes are moist.      Pharynx: Oropharynx is clear. No oropharyngeal exudate.   Eyes:      General: No scleral icterus.        Right eye: No discharge.         Left eye: No discharge.      Extraocular Movements: Extraocular movements intact.      Conjunctiva/sclera: Conjunctivae normal.      Pupils: Pupils are equal, round, and reactive to light.   Neck:      Musculoskeletal: Normal range of motion and neck supple. No neck rigidity or muscular tenderness.      Thyroid: No thyromegaly.      Vascular: No carotid bruit or JVD.      Trachea: No tracheal deviation.   Cardiovascular:      Rate and Rhythm: Normal rate and regular rhythm.      Pulses: Normal pulses.      Heart sounds: Normal heart sounds. No murmur. No friction rub. No gallop.    Pulmonary:      Effort: Pulmonary effort is normal. No respiratory distress.      Breath sounds: Normal breath sounds. No wheezing, rhonchi or rales.   Chest:      " Chest wall: No tenderness.   Abdominal:      General: Bowel sounds are normal. There is no distension.      Palpations: Abdomen is soft. There is no mass.      Tenderness: There is no abdominal tenderness. There is no right CVA tenderness, left CVA tenderness, guarding or rebound.   Musculoskeletal: Normal range of motion.         General: No swelling.      Right lower leg: No edema.      Left lower leg: No edema.   Lymphadenopathy:      Cervical: No cervical adenopathy.   Skin:     General: Skin is warm and dry.      Capillary Refill: Capillary refill takes less than 2 seconds.      Coloration: Skin is not jaundiced or pale.      Findings: No bruising, erythema or rash.   Neurological:      General: No focal deficit present.      Mental Status: She is alert and oriented to person, place, and time. Mental status is at baseline.      Cranial Nerves: No cranial nerve deficit.      Motor: No weakness.      Coordination: Coordination normal.      Gait: Gait normal.      Deep Tendon Reflexes: Reflexes normal.   Psychiatric:         Mood and Affect: Mood normal.         Behavior: Behavior normal.         Thought Content: Thought content normal.         Judgment: Judgment normal.         Assessment:       1. Diabetes, type 1.5, uncontrolled, managed as type 1    2. Diabetic peripheral neuropathy    3. Essential hypertension    4. Stable angina pectoris    5. Hypothyroidism (acquired)    6. Palpitations    7. Chronic tension-type headache, not intractable    8. Fibromyalgia syndrome    9. B12 deficiency    10. Vitamin D deficiency        Plan:       Diabetes, type 1.5, uncontrolled, managed as type 1    Diabetic peripheral neuropathy    Essential hypertension  -     lisinopriL-hydrochlorothiazide (PRINZIDE,ZESTORETIC) 20-12.5 mg per tablet; Take 2 tablets by mouth once daily.  Dispense: 180 tablet; Refill: 3        -     amLODIPine (NORVASC) 5 MG tablet; Take 1 tablet (5 mg total) by mouth every evening.           Dispense:  90 tablet; Refill: 3    Stable angina pectoris  -     nitroGLYCERIN (NITROSTAT) 0.4 MG SL tablet; Place 1 tablet (0.4 mg total) under the tongue every 5 (five) minutes as needed for Chest pain.  Dispense: 25 tablet; Refill: 0  -     Ambulatory referral/consult to Cardiology; Future; Expected date: 10/22/2020  -     Stress Echo Which stress agent will be used? Treadmill Exercise; Color Flow Doppler? No; Future    Hypothyroidism (acquired)    Palpitations    Chronic tension-type headache, not intractable    Fibromyalgia syndrome    B12 deficiency  -     cyanocobalamin injection 1,000 mcg  -     cyanocobalamin 1,000 mcg/mL injection; Inject 1 mL (1,000 mcg total) into the muscle once a week.  Dispense: 10 mL; Refill: 1    Vitamin D deficiency  -     cholecalciferol, vitamin D3, (DECARA) 1,250 mcg (50,000 unit) capsule; Take 1 capsule (50,000 Units total) by mouth once a week. Take with a meal or with a spoonful of peanut butter for low vitamin D. for 12 doses  Dispense: 12 capsule; Refill: 0      Referrals to neurology and endocrinology and GI all pending.    Look at your home Lasix and verify what dose you are taking (10mg vs 20mg.) Patient not clear on dose at time of visit.    Start a pre-and-probiotic 10 billion    Start Aspirin 81mg daily    She needs to start a cholesterol medication--patient is adamant that she will not take a statin. Continue omega-3 supplement for now. Will re-address at follow up.    Try Xyzal 5mg antihistamine for severe allergic rhinitis symptoms        Strict return precautions reviewed and patient verbalized understanding. Risks, benefits, and alternatives to the plan were reviewed in detail and all questions answered to the patient's satisfaction. 45 minutes were spent with patient, >50% of time based on counseling and coordination of care.    Follow up in about 2 weeks (around 10/29/2020) for BP, chest pain, med changes, allergic rhinitis.

## 2020-10-16 ENCOUNTER — PATIENT MESSAGE (OUTPATIENT)
Dept: FAMILY MEDICINE | Facility: CLINIC | Age: 55
End: 2020-10-16

## 2020-10-16 RX ORDER — AMLODIPINE BESYLATE 5 MG/1
5 TABLET ORAL NIGHTLY
Qty: 90 TABLET | Refills: 3 | Status: SHIPPED | OUTPATIENT
Start: 2020-10-16 | End: 2020-11-03

## 2020-10-16 NOTE — TELEPHONE ENCOUNTER
Needs to go to the ER if she has chest pressure!    Med adjusted yesterday from lisinopril 40mg daily to lisinopril 20/HCTZ12.5mg TWO tablets daily.    I have now sent in additional med: amlodipine 5mg to take at night.    With chest PRESSURE AND PAIN, having DIABETES, she will need to have cardiac workup. I ordered stress echo and a cardiology referral. See if she can get in next week.    Also, she was prescribed NITROGLYCERIN yesterday at her visit. Has she used it?    I will be unavailable after 4:30 this afternoon. If she has NOT used the nitroglycerin, she should use it as directed. If she HAS used it, and got NO RELIEF, she needs to be evaluated in the ER.

## 2020-10-18 ENCOUNTER — PATIENT MESSAGE (OUTPATIENT)
Dept: FAMILY MEDICINE | Facility: CLINIC | Age: 55
End: 2020-10-18

## 2020-10-19 ENCOUNTER — PATIENT MESSAGE (OUTPATIENT)
Dept: FAMILY MEDICINE | Facility: CLINIC | Age: 55
End: 2020-10-19

## 2020-10-19 DIAGNOSIS — G44.019 EPISODIC CLUSTER HEADACHE, NOT INTRACTABLE: ICD-10-CM

## 2020-10-19 DIAGNOSIS — R42 DIZZINESS AND GIDDINESS: ICD-10-CM

## 2020-10-19 RX ORDER — CITALOPRAM 20 MG/1
20 TABLET, FILM COATED ORAL NIGHTLY
Qty: 90 TABLET | Refills: 3 | Status: SHIPPED | OUTPATIENT
Start: 2020-10-19 | End: 2020-11-18

## 2020-10-19 NOTE — TELEPHONE ENCOUNTER
Called pt via phone and informed her of Stress Echo appt on 10/28/20 at 10:30 [facility] and MRI schedule on 10/21/20 at 10:30 in [place]. Pt voices understanding. Pt states she has not taken the Nitro but does keep it with her for chest pain. Pt states she would need a letter for her job to be off work for health concerns and she can pick it up tomorrow. Pt advised to call scheduling at [phone] if the times will not work.

## 2020-10-19 NOTE — TELEPHONE ENCOUNTER
"When is patient's appointment with cardiology?    Can she get in to have the stress test prior to the cardiology visit?    When she has the chest pressure, is she taking nitroglycerin?    The two blood pressure readings are noted (151/90 and 138/79), and both are better than what she was having las week.     As far as her headache, I feel it is multifactorial. I have added a neurology referral too, and have placed an order for an MRI brain for recurring/chronic headache. I have also recommended she STOP the Excedrin.    She might need to be off work until she has her "heart tests" completed and has undergone evaluation by cardiology.    Can someone please update me on the soonest her stress echo can be done? And the soonest she can get in with cardiology (would rather her see cards before the testing.)  "

## 2020-10-20 ENCOUNTER — PATIENT MESSAGE (OUTPATIENT)
Dept: FAMILY MEDICINE | Facility: CLINIC | Age: 55
End: 2020-10-20

## 2020-10-21 ENCOUNTER — TELEPHONE (OUTPATIENT)
Dept: FAMILY MEDICINE | Facility: CLINIC | Age: 55
End: 2020-10-21

## 2020-10-21 ENCOUNTER — HOSPITAL ENCOUNTER (OUTPATIENT)
Dept: RADIOLOGY | Facility: HOSPITAL | Age: 55
Discharge: HOME | End: 2020-10-21
Attending: FAMILY MEDICINE
Payer: COMMERCIAL

## 2020-10-21 DIAGNOSIS — G44.019 EPISODIC CLUSTER HEADACHE, NOT INTRACTABLE: ICD-10-CM

## 2020-10-21 DIAGNOSIS — R42 DIZZINESS AND GIDDINESS: ICD-10-CM

## 2020-10-21 DIAGNOSIS — R51.9 DAILY HEADACHE: Primary | ICD-10-CM

## 2020-10-21 PROCEDURE — 70551 MRI BRAIN STEM W/O DYE: CPT | Mod: TC,PN

## 2020-10-21 PROCEDURE — 70551 MRI BRAIN STEM W/O DYE: CPT | Mod: 26,,, | Performed by: RADIOLOGY

## 2020-10-21 PROCEDURE — 70551 MRI BRAIN WITHOUT CONTRAST: ICD-10-PCS | Mod: 26,,, | Performed by: RADIOLOGY

## 2020-10-21 NOTE — TELEPHONE ENCOUNTER
Patient requesting a letter for medical documentation for her job. Please attach to portable.     Thank you

## 2020-10-22 ENCOUNTER — PATIENT MESSAGE (OUTPATIENT)
Dept: FAMILY MEDICINE | Facility: CLINIC | Age: 55
End: 2020-10-22

## 2020-10-22 DIAGNOSIS — Z12.31 OTHER SCREENING MAMMOGRAM: ICD-10-CM

## 2020-10-26 ENCOUNTER — PATIENT MESSAGE (OUTPATIENT)
Dept: FAMILY MEDICINE | Facility: CLINIC | Age: 55
End: 2020-10-26

## 2020-10-27 ENCOUNTER — TELEPHONE (OUTPATIENT)
Dept: FAMILY MEDICINE | Facility: CLINIC | Age: 55
End: 2020-10-27

## 2020-10-27 ENCOUNTER — PATIENT OUTREACH (OUTPATIENT)
Dept: ADMINISTRATIVE | Facility: HOSPITAL | Age: 55
End: 2020-10-27

## 2020-10-27 ENCOUNTER — PATIENT MESSAGE (OUTPATIENT)
Dept: FAMILY MEDICINE | Facility: CLINIC | Age: 55
End: 2020-10-27

## 2020-10-27 RX ORDER — BUTALBITAL AND ACETAMINOPHEN 325; 50 MG/1; MG/1
1 TABLET ORAL EVERY 6 HOURS PRN
Qty: 30 TABLET | Refills: 0 | Status: SHIPPED | OUTPATIENT
Start: 2020-10-27 | End: 2020-11-26

## 2020-10-27 NOTE — TELEPHONE ENCOUNTER
10-    To whom this may concern:    Geovanna Loo is a patient under my care. It is my recommendation that she remain off work until she is further evaluated (tests, additional appointments with specialists, etc.) and treated for certain medical conditions.     Please call with any concerns you may have, and thank you for your understanding.            Diann Goodson MD  Family Physician  [facility]

## 2020-10-27 NOTE — TELEPHONE ENCOUNTER
----- Message from Uzma Byrne sent at 10/27/2020  2:02 PM CDT -----  Contact: call pt [phone]    Type:  RX Refill Request    Who Called:   pt  New Rx:    RX Name and Strength:    miragrain  headaches // asking  for a  med   Preferred Pharmacy with phone number:    [facility]/pharmacy #[id number] - [place] - [address]  [address] [address]  [place] [facility] [place] [address]  Phone: [phone] Fax: [phone]  Best Call Back Number: call pt [phone]  Additional Information:    please call  for  details

## 2020-10-27 NOTE — LETTER
October 28, 2020      [facility]  [address] [address]  Phone: [phone]       Patient: Geovanna Loo   YOB: 1965  Date of Visit: 10/27/2020    To Whom It May Concern:  10-       Geovanna Loo is a patient under my care. It is my recommendation that she remain off work until she is further evaluated (tests, additional appointments with specialists, etc.) and treated for certain medical conditions.      Please call with any concerns you may have, and thank you for your understanding.               Sincerely ,     Diann Goodson MD  Family Physician  [facility]         Documentation

## 2020-10-27 NOTE — TELEPHONE ENCOUNTER
"I am having difficulty creating a work letter in the Epic system. Unable to open the create letter tab.    I have sent in Rx for headache medication--do not drive after taking, as it can cause drowsiness.    Please notify patient. Also, I will create a note documenting the fact I would like for her to be off work until evaluated by cardiology. See additional "note" within this message.    If you could help to paste this into the "letters" tab to create a printable note on letterhead, that would be best.  "

## 2020-10-28 ENCOUNTER — PATIENT MESSAGE (OUTPATIENT)
Dept: ADMINISTRATIVE | Facility: HOSPITAL | Age: 55
End: 2020-10-28

## 2020-10-28 ENCOUNTER — PATIENT MESSAGE (OUTPATIENT)
Dept: FAMILY MEDICINE | Facility: CLINIC | Age: 55
End: 2020-10-28

## 2020-10-28 DIAGNOSIS — R42 DIZZINESS AND GIDDINESS: ICD-10-CM

## 2020-10-28 DIAGNOSIS — R51.9 DAILY HEADACHE: ICD-10-CM

## 2020-10-28 NOTE — PROGRESS NOTES
See my three orders for endocrinology referral. Please assist with urgent appointment, patient has insulin PUMP and having problems with blood sugars fluctuating, very symptomatic.    Also, I have entered a referral to neurology for her abnormal MRI and new daily HA. This is urgent as well.    She needs follow up next week for blood pressure HA and DM review.    Was she able to use the letter I typed out in the notes section? For her work excuse?

## 2020-10-28 NOTE — TELEPHONE ENCOUNTER
Patient requesting letter for work, provider ordered documentation be typed and sent to patient via patient portal.

## 2020-10-30 ENCOUNTER — PATIENT MESSAGE (OUTPATIENT)
Dept: ADMINISTRATIVE | Facility: HOSPITAL | Age: 55
End: 2020-10-30

## 2020-11-01 ENCOUNTER — PATIENT OUTREACH (OUTPATIENT)
Dept: ADMINISTRATIVE | Facility: OTHER | Age: 55
End: 2020-11-01

## 2020-11-01 NOTE — PROGRESS NOTES
Care Everywhere: updated  Immunization: no profile in links   Health Maintenance: updated  Media Review: review for outside colon cancer and eye exam report   Legacy Review:   Order placed:   Upcoming appts:mammogram 11/7  Newly diagnosed diabetic , optometry only

## 2020-11-02 ENCOUNTER — PATIENT MESSAGE (OUTPATIENT)
Dept: FAMILY MEDICINE | Facility: CLINIC | Age: 55
End: 2020-11-02

## 2020-11-02 ENCOUNTER — HOSPITAL ENCOUNTER (OUTPATIENT)
Dept: CARDIOLOGY | Facility: HOSPITAL | Age: 55
Discharge: HOME | End: 2020-11-02
Attending: FAMILY MEDICINE
Payer: COMMERCIAL

## 2020-11-02 VITALS
WEIGHT: 192 LBS | HEIGHT: 65 IN | BODY MASS INDEX: 31.99 KG/M2 | DIASTOLIC BLOOD PRESSURE: 55 MMHG | HEART RATE: 75 BPM | SYSTOLIC BLOOD PRESSURE: 135 MMHG

## 2020-11-02 DIAGNOSIS — I20.89 STABLE ANGINA PECTORIS: ICD-10-CM

## 2020-11-02 DIAGNOSIS — I10 ESSENTIAL HYPERTENSION: Primary | ICD-10-CM

## 2020-11-02 LAB
AORTIC ROOT ANNULUS: 2.72 CM
AORTIC VALVE CUSP SEPERATION: 1.65 CM
BSA FOR ECHO PROCEDURE: 2 M2
CV ECHO LV RWT: 0.46 CM
CV STRESS BASE HR: 75 BPM
DIASTOLIC BLOOD PRESSURE: 79 MMHG
DOP CALC LVOT AREA: 3.1 CM2
DOP CALC LVOT DIAMETER: 1.99 CM
ECHO LV POSTERIOR WALL: 0.98 CM (ref 0.6–1.1)
FRACTIONAL SHORTENING: 36 % (ref 28–44)
INTERVENTRICULAR SEPTUM: 0.91 CM (ref 0.6–1.1)
LA MAJOR: 4.33 CM
LA MINOR: 3.19 CM
LEFT ATRIUM SIZE: 3.92 CM
LEFT INTERNAL DIMENSION IN SYSTOLE: 2.7 CM (ref 2.1–4)
LEFT VENTRICLE DIASTOLIC VOLUME INDEX: 41.1 ML/M2
LEFT VENTRICLE DIASTOLIC VOLUME: 79.91 ML
LEFT VENTRICLE MASS INDEX: 66 G/M2
LEFT VENTRICLE SYSTOLIC VOLUME INDEX: 13.9 ML/M2
LEFT VENTRICLE SYSTOLIC VOLUME: 27.07 ML
LEFT VENTRICULAR INTERNAL DIMENSION IN DIASTOLE: 4.23 CM (ref 3.5–6)
LEFT VENTRICULAR MASS: 128.34 G
OHS CV CPX 1 MINUTE RECOVERY HEART RATE: 93 BPM
OHS CV CPX 85 PERCENT MAX PREDICTED HEART RATE MALE: 133
OHS CV CPX ESTIMATED METS: 6
OHS CV CPX MAX PREDICTED HEART RATE: 157
OHS CV CPX PATIENT IS FEMALE: 1
OHS CV CPX PATIENT IS MALE: 0
OHS CV CPX PEAK DIASTOLIC BLOOD PRESSURE: 82 MMHG
OHS CV CPX PEAK HEAR RATE: 150 BPM
OHS CV CPX PEAK RATE PRESSURE PRODUCT: NORMAL
OHS CV CPX PEAK SYSTOLIC BLOOD PRESSURE: 175 MMHG
OHS CV CPX PERCENT MAX PREDICTED HEART RATE ACHIEVED: 96
OHS CV CPX RATE PRESSURE PRODUCT PRESENTING: 9525
RA MAJOR: 4.12 CM
RA WIDTH: 2.7 CM
RIGHT VENTRICULAR END-DIASTOLIC DIMENSION: 3.56 CM
STRESS ECHO POST EXERCISE DUR MIN: 4 MINUTES
STRESS ECHO POST EXERCISE DUR SEC: 58 SECONDS
SYSTOLIC BLOOD PRESSURE: 127 MMHG

## 2020-11-02 PROCEDURE — 93351 STRESS TTE COMPLETE: CPT

## 2020-11-02 PROCEDURE — 93351 STRESS ECHO (CUPID ONLY): ICD-10-PCS | Mod: 26,,, | Performed by: INTERNAL MEDICINE

## 2020-11-02 PROCEDURE — 93351 STRESS TTE COMPLETE: CPT | Mod: 26,,, | Performed by: INTERNAL MEDICINE

## 2020-11-02 NOTE — NURSING NOTE
Target heart rate 139. Pt exceeded 139 bpm. C/o slight shortness of breath 2 on vivienne. 1 minute after exercise heart rate 93

## 2020-11-03 ENCOUNTER — PATIENT MESSAGE (OUTPATIENT)
Dept: FAMILY MEDICINE | Facility: CLINIC | Age: 55
End: 2020-11-03

## 2020-11-03 ENCOUNTER — TELEPHONE (OUTPATIENT)
Dept: FAMILY MEDICINE | Facility: CLINIC | Age: 55
End: 2020-11-03

## 2020-11-03 ENCOUNTER — OFFICE VISIT (OUTPATIENT)
Dept: ENDOCRINOLOGY | Facility: CLINIC | Age: 55
End: 2020-11-03
Payer: COMMERCIAL

## 2020-11-03 VITALS
WEIGHT: 187.38 LBS | HEIGHT: 65 IN | DIASTOLIC BLOOD PRESSURE: 78 MMHG | OXYGEN SATURATION: 98 % | SYSTOLIC BLOOD PRESSURE: 112 MMHG | HEART RATE: 70 BPM | BODY MASS INDEX: 31.22 KG/M2 | RESPIRATION RATE: 18 BRPM

## 2020-11-03 DIAGNOSIS — R51.9 DAILY HEADACHE: Primary | ICD-10-CM

## 2020-11-03 DIAGNOSIS — E03.9 HYPOTHYROIDISM (ACQUIRED): ICD-10-CM

## 2020-11-03 DIAGNOSIS — G44.52 NEW DAILY PERSISTENT HEADACHE: ICD-10-CM

## 2020-11-03 DIAGNOSIS — E11.42 DIABETIC PERIPHERAL NEUROPATHY: ICD-10-CM

## 2020-11-03 PROCEDURE — 3051F HG A1C>EQUAL 7.0%<8.0%: CPT | Mod: S$GLB,,, | Performed by: INTERNAL MEDICINE

## 2020-11-03 PROCEDURE — 3008F BODY MASS INDEX DOCD: CPT | Mod: S$GLB,,, | Performed by: INTERNAL MEDICINE

## 2020-11-03 PROCEDURE — 99204 OFFICE O/P NEW MOD 45 MIN: CPT | Mod: S$GLB,,, | Performed by: INTERNAL MEDICINE

## 2020-11-03 PROCEDURE — 3051F PR MOST RECENT HEMOGLOBIN A1C LEVEL 7.0 - < 8.0%: ICD-10-PCS | Mod: S$GLB,,, | Performed by: INTERNAL MEDICINE

## 2020-11-03 PROCEDURE — 99999 PR PBB SHADOW E&M-EST. PATIENT-LVL V: ICD-10-PCS | Mod: PBBFAC,,, | Performed by: INTERNAL MEDICINE

## 2020-11-03 PROCEDURE — 3008F PR BODY MASS INDEX (BMI) DOCUMENTED: ICD-10-PCS | Mod: S$GLB,,, | Performed by: INTERNAL MEDICINE

## 2020-11-03 PROCEDURE — 99204 PR OFFICE/OUTPT VISIT, NEW, LEVL IV, 45-59 MIN: ICD-10-PCS | Mod: S$GLB,,, | Performed by: INTERNAL MEDICINE

## 2020-11-03 PROCEDURE — 99999 PR PBB SHADOW E&M-EST. PATIENT-LVL V: CPT | Mod: PBBFAC,,, | Performed by: INTERNAL MEDICINE

## 2020-11-03 RX ORDER — SUB-Q INSULIN DEVICE, 40 UNIT
EACH MISCELLANEOUS
COMMUNITY
Start: 2020-10-23

## 2020-11-03 RX ORDER — METOPROLOL SUCCINATE 25 MG/1
25 TABLET, EXTENDED RELEASE ORAL NIGHTLY
Qty: 90 TABLET | Refills: 0 | Status: SHIPPED | OUTPATIENT
Start: 2020-11-03 | End: 2021-02-01

## 2020-11-03 NOTE — LETTER
November 3, 2020      Diann Goodson MD  [address] [place] [address]           [facility]  [address]  [facility] [address]  Phone: [phone]  Fax: [phone]          Patient: Geovanna Loo   MR Number: [id number]   YOB: 1965   Date of Visit: 11/3/2020       Dear Dr. Diann Goodson:    Thank you for referring Geovanna Loo to me for evaluation. Attached you will find relevant portions of my assessment and plan of care.    If you have questions, please do not hesitate to call me. I look forward to following Geovanna Loo along with you.    Sincerely,    Madonna Shannon MD    Enclosure  CC:  No Recipients    If you would like to receive this communication electronically, please contact [email or web address] or [phone] to request more information on [employer] and [employer] access.    For providers and/or their staff who would like to refer a patient to [facility], please contact us through our one-stop-shop provider referral line, [facility], at [phone].    If you feel you have received this communication in error or would no longer like to receive these types of communications, please e-mail [email or web address]

## 2020-11-03 NOTE — TELEPHONE ENCOUNTER
"Please notify patient to monitor her blood pressure. She has upcoming appointment with cardiology on 11-.    She states she cannot take amlodipine.    Is she still taking lisinopril-hydrocholorothiazide 20-12.5mg 2 tablets daily? If so, we can add another dose of PLAIN lisinopril in the evenings to help with blood pressure control.    As far as her concern with wanting her off work: she has had MULTIPLE complaints of chest pain, severe HA, problems with her blood sugars and tolerance of insulin pump, abdominal pain, etc. My concern is that she continues to have chest pain and I did not want her exerting herself, as she has had ER visits in the past for problems noted above.    Geovanna is a very complex medical patient, and it is simply not wise nor possible to manage with "mostly" messges/virtual communication. I am happy to schedule a VV for her is she desires change in blood pressure medication. Let me know.   "

## 2020-11-03 NOTE — TELEPHONE ENCOUNTER
See other MyChart encounter for explanation for patient being off work.    Due to her recent episodes of chest pain and referral to cardiology, I recommend she does not exert herself physically or emotionally.    She has appointment with Dr. Almaraz next week.

## 2020-11-03 NOTE — PROGRESS NOTES
"Subjective:      Patient ID: Geovanna Loo is a 55 y.o. female.    Chief Complaint:  Diabetes      History of Present Illness  Ms. Loo is a 55 year old woman who is for type 1 diabetes management. Initially diagnosed with T2DM 14 years ago. Re-diagnosed as type 1 DM, with  GERARDO Ab was mildly positive, 0.03.     Diabetes medications include:  VGo 30 -- does not always use appropriately. Today forgot to apply VGo  Three clicks before meals   1 click before snacks.     Metformin -- stopped working after three years  Glipizide -   victoza - weight loss; started causing problems  basaglar -- throat closed    Reports redness and pruritus to the VGo adhesive. But mild symptoms and feels she is able to tolerate. Denies hypoglycemic episodes or symptoms.      Diabetes complications:  Last eye evaluation 3/2020 --  "spot in the left eye."  Denies numbness, tingling, burning sensation, symptomatic CAD or CVD or kidney disease.    Last urine test 10/2020 normal.   Denies hospitalizations for hyper- or hypo- glycemia.    Diabetes education: has attended.     SMBG: Tests BG times a day  FB, 226, x, x, x, x, x, x,122  After breakfast 192 (two clicks)  Pre-lunch:173  Pre-dinner:  Bedtime:    Exercise: no formal exercise    Also has hypothyroidism on LT4 75 mcg five times weekly. Was diagnosed 20 years ago. Has used 50 mcg for the majority of the time.   Mother, father, brother and sister with thyroid disease.     Also diagnosed with B12 deficiency one month ago and is currently on once weekly injections.     Review of Systems   Constitutional: Negative for fever.   HENT: Negative for congestion.    Eyes: Negative for visual disturbance.   Respiratory: Negative for shortness of breath.    Cardiovascular: Negative for chest pain.   Gastrointestinal: Negative for abdominal pain.   Genitourinary: Negative for dysuria.   Musculoskeletal: Negative for arthralgias.   Skin: Negative for rash.   Neurological: Negative for weakness. " "  Hematological: Does not bruise/bleed easily.   Psychiatric/Behavioral: Negative for sleep disturbance.       Objective:   Physical Exam  Constitutional:       General: She is not in acute distress.     Appearance: She is well-developed.   HENT:      Head: Normocephalic and atraumatic.      Mouth/Throat:      Pharynx: No oropharyngeal exudate.   Eyes:      General: No scleral icterus.     Conjunctiva/sclera: Conjunctivae normal.      Pupils: Pupils are equal, round, and reactive to light.   Neck:      Musculoskeletal: Normal range of motion and neck supple.      Thyroid: No thyromegaly.      Trachea: No tracheal deviation.   Cardiovascular:      Rate and Rhythm: Normal rate and regular rhythm.      Heart sounds: Normal heart sounds.   Pulmonary:      Effort: Pulmonary effort is normal.      Breath sounds: Normal breath sounds.   Abdominal:      General: Bowel sounds are normal. There is no distension.      Palpations: Abdomen is soft.      Tenderness: There is no abdominal tenderness.      Comments: Sites of adhesive are present over abdomen   Two areas that are erythematic -- mild, no excoriations  Not indurated or elevated     Musculoskeletal: Normal range of motion.         General: No tenderness.   Lymphadenopathy:      Cervical: No cervical adenopathy.   Skin:     General: Skin is warm and dry.      Comments:     FOOT EXAM:  Visual inspection is normal, no abrasions, bruising or calluses.   Microfilament test is intact b/l.   Vibratory sense is intact b/l.   Distal pulses are present b/l.    Neurological:      Mental Status: She is alert and oriented to person, place, and time.      Cranial Nerves: No cranial nerve deficit.      Deep Tendon Reflexes: Reflexes are normal and symmetric.       Vitals:    11/03/20 0816   BP: 112/78   Pulse: 70   Resp: 18   SpO2: 98%   Weight: 85 kg (187 lb 5.6 oz)   Height: 5' 5" (1.651 m)       BP Readings from Last 3 Encounters:   11/03/20 112/78   11/02/20 (!) 135/55   10/15/20 " (!) 183/88     Wt Readings from Last 1 Encounters:   11/03/20 0816 85 kg (187 lb 5.6 oz)         Body mass index is 31.18 kg/m².    Lab Review:   Lab Results   Component Value Date    HGBA1C 7.1 (H) 10/05/2020     Lab Results   Component Value Date    CHOL 215 (H) 10/05/2020    HDL 46 10/05/2020    LDLCALC 153.6 10/05/2020    TRIG 77 10/05/2020    CHOLHDL 21.4 10/05/2020     Lab Results   Component Value Date     10/05/2020    K 4.2 10/05/2020     10/05/2020    CO2 28 10/05/2020     (H) 10/05/2020    BUN 16 10/05/2020    CREATININE 0.4 (L) 10/05/2020    CALCIUM 8.8 10/05/2020    PROT 6.9 10/05/2020    ALBUMIN 3.8 10/05/2020    BILITOT 0.4 10/05/2020    ALKPHOS 74 10/05/2020    AST 21 10/05/2020    ALT 21 10/05/2020    ANIONGAP 8 10/05/2020    ESTGFRAFRICA >60.0 10/05/2020    EGFRNONAA >60.0 10/05/2020    TSH 1.148 10/05/2020         Assessment and Plan     Diabetes, type 1.5, uncontrolled, managed as type 1  Needs Cpeptide and glucose   Continue VGo 30 for now  May benefit from metformin/GLP1 agonist to help with weight loss in addition to insulin     She does not have her VGo this morning.   Have checked  --> treated with two units of novolog     Plan to restart VGo as soon as she gets home.     Discussed goals of treatments.   Discussed use of professional CGM to have a better idea of control at home.   Recent A1C is fair.     No hypoglycemia. Reviewed symptoms and treatment.     Hypothyroidism (acquired)  Free T4 is below the normal range with normal TSH --> secondary?  Plan to check FSH and TSH

## 2020-11-03 NOTE — TELEPHONE ENCOUNTER
Called patient back. Advised referral placed internally to [facility] providers on 10/28/2020. Told patient that if they do not call her by the end of the week, give us a call back.  Patient verbalized understanding.

## 2020-11-03 NOTE — TELEPHONE ENCOUNTER
Assuming she takes her lisinopril-HCTZ 20-12.5mg 2 tablets in the morning, I have sent in a prescription for generic Toprol XL. She can start with HALF a tablet at night (yes, even though it is XL she can break it.) If blood pressure does not improve with HALF a tablet, she can increase to a whole tablet nightly.    Also, I have added amlodipine to her allergy list since she did not tolerate.

## 2020-11-03 NOTE — TELEPHONE ENCOUNTER
----- Message from Stacy Tyler MA sent at 11/3/2020  9:32 AM CST -----  Regarding: patient  Contact: patient  Type:  Appointment Request with Neurologist     Caller is requesting a earlier appointment.        Name of Caller: Geovanna Loo   When is the first available appointment?  Patient states she spoke with someone regarding scheduling with neurologist was going to be end of Feb of 2021 patient states that is not soon enough per patient and will like to speak to  at office   Symptoms:  Dizzness   Best Call Back Number:  [phone] (home)     Additional Information:

## 2020-11-03 NOTE — TELEPHONE ENCOUNTER
See TWO neurology referral orders for NEW daily headache and abnormal MRI. Please help with scheduling--[place] or other [facility] location is my request.

## 2020-11-03 NOTE — PROGRESS NOTES
0840 Gave 2 units novalog insulin to posterior right upper arm per Dr. Shannon pt tolerated well

## 2020-11-03 NOTE — ASSESSMENT & PLAN NOTE
Needs Cpeptide and glucose   Continue VGo 30 for now  May benefit from metformin/GLP1 agonist to help with weight loss in addition to insulin     She does not have her VGo this morning.   Have checked  --> treated with two units of novolog     Plan to restart VGo as soon as she gets home.     Discussed goals of treatments.   Discussed use of professional CGM to have a better idea of control at home.   Recent A1C is fair.     No hypoglycemia. Reviewed symptoms and treatment.

## 2020-11-04 ENCOUNTER — PATIENT OUTREACH (OUTPATIENT)
Dept: FAMILY MEDICINE | Facility: CLINIC | Age: 55
End: 2020-11-04

## 2020-11-04 NOTE — PROGRESS NOTES
Patients info sent to Neurology , info will be sent to Neurology pool to schedule patient for first available.

## 2020-11-10 ENCOUNTER — PATIENT MESSAGE (OUTPATIENT)
Dept: ENDOCRINOLOGY | Facility: CLINIC | Age: 55
End: 2020-11-10

## 2020-11-10 ENCOUNTER — PATIENT OUTREACH (OUTPATIENT)
Dept: ADMINISTRATIVE | Facility: HOSPITAL | Age: 55
End: 2020-11-10

## 2020-11-10 ENCOUNTER — PATIENT MESSAGE (OUTPATIENT)
Dept: FAMILY MEDICINE | Facility: CLINIC | Age: 55
End: 2020-11-10

## 2020-11-10 NOTE — PROGRESS NOTES
Population Health Outreach.  11/10/2020  EFAX SENT TO [facility] FOR MOST RECENT COLONOSCOPY RESULT  [phone]  [phone]  EFAX SENT TO [facility] FOR MOST RECENT DM EYE EXAM

## 2020-11-10 NOTE — LETTER
FAX      AUTHORIZATION FOR RELEASE OF   CONFIDENTIAL INFORMATION        [facility]      We are seeing Geovanna Loo, date of birth 1965, in the clinic at [facility]. Diann Goodson MD is the patient's PCP. Geovanna Loo has an outstanding lab/procedure at the time we reviewed their chart. In order to help keep their health information updated, Geovanna Loo has authorized us to request the following medical record(s):       ( X )  COLONOSCOPY (WITHIN LAST 10 YRS)       Please fax records to [facility]  [phone]     If you have any questions, please contact Lisset at [phone].      Lisset Almanzar L.P.N. Clinical Care Coordinator  [address] [place] [address], [place] [address] [phone]

## 2020-11-10 NOTE — LETTER
FAX      AUTHORIZATION FOR RELEASE OF   CONFIDENTIAL INFORMATION          [facility]      We are seeing Geovanna Loo, date of birth 1965, in the clinic at [facility]. Diann Goodson MD is the patient's PCP. Geovanna Loo has an outstanding lab/procedure at the time we reviewed their chart. In order to help keep their health information updated, Geovanna Loo has authorized us to request the following medical record(s):             ( X )  DIABETIC EYE EXAM             Please fax records to [facility]  [phone]     If you have any questions, please contact Lisset at [phone].      Lisset Almanzar L.P.N. Clinical Care Coordinator  [address] [place] [address], [place] [address] [phone]

## 2020-11-10 NOTE — LETTER
FAX      AUTHORIZATION FOR RELEASE OF   CONFIDENTIAL INFORMATION        [facility]      We are seeing Geovanna Loo, date of birth 1965, in the clinic at [facility]. Diann Goodson MD is the patient's PCP. Geovanna Loo has an outstanding lab/procedure at the time we reviewed their chart. In order to help keep their health information updated, Geovanna Loo has authorized us to request the following medical record(s):          ( X )  COLONOSCOPY      ( X )  OUTSIDE IMMUNIZATIONS                         Please fax records to [facility]  [phone]     If you have any questions, please contact Lisset at [phone].      Lisset Almanzar L.P.N. Clinical Care Coordinator  [address] [place] [address], [place] [address] [phone]

## 2020-11-10 NOTE — LETTER
FAX      AUTHORIZATION FOR RELEASE OF   CONFIDENTIAL INFORMATION        [facility]      We are seeing Geovanna Loo, date of birth 1965, in the clinic at [facility]. Diann Goodson MD is the patient's PCP. Geovanna Loo has an outstanding lab/procedure at the time we reviewed their chart. In order to help keep their health information updated, Geovanna Loo has authorized us to request the following medical record(s):       ( X )  COLONOSCOPY (WITH IN 10 YRS)      Please fax records to [facility]  [phone]     If you have any questions, please contact Lisset at [phone].      Lisset Almanzar L.P.N. Clinical Care Coordinator  [address] [place] [address], [place] [address] [phone]

## 2020-11-11 ENCOUNTER — PATIENT MESSAGE (OUTPATIENT)
Dept: FAMILY MEDICINE | Facility: CLINIC | Age: 55
End: 2020-11-11

## 2020-11-11 ENCOUNTER — OFFICE VISIT (OUTPATIENT)
Dept: NEUROLOGY | Facility: CLINIC | Age: 55
End: 2020-11-11
Payer: COMMERCIAL

## 2020-11-11 ENCOUNTER — TELEPHONE (OUTPATIENT)
Dept: FAMILY MEDICINE | Facility: CLINIC | Age: 55
End: 2020-11-11

## 2020-11-11 VITALS
SYSTOLIC BLOOD PRESSURE: 97 MMHG | BODY MASS INDEX: 31.22 KG/M2 | DIASTOLIC BLOOD PRESSURE: 60 MMHG | WEIGHT: 187.63 LBS | HEART RATE: 61 BPM

## 2020-11-11 DIAGNOSIS — G44.86 CERVICOGENIC HEADACHE: ICD-10-CM

## 2020-11-11 DIAGNOSIS — R51.9 CHRONIC DAILY HEADACHE: ICD-10-CM

## 2020-11-11 DIAGNOSIS — M54.81 BILATERAL OCCIPITAL NEURALGIA: Primary | ICD-10-CM

## 2020-11-11 DIAGNOSIS — I67.9 SMALL VESSEL DISEASE, CEREBROVASCULAR: ICD-10-CM

## 2020-11-11 DIAGNOSIS — G47.33 OSA (OBSTRUCTIVE SLEEP APNEA): ICD-10-CM

## 2020-11-11 DIAGNOSIS — M50.30 DDD (DEGENERATIVE DISC DISEASE), CERVICAL: ICD-10-CM

## 2020-11-11 PROCEDURE — 99244 PR OFFICE CONSULTATION,LEVEL IV: ICD-10-PCS | Mod: S$GLB,,, | Performed by: PSYCHIATRY & NEUROLOGY

## 2020-11-11 PROCEDURE — 99999 PR PBB SHADOW E&M-EST. PATIENT-LVL IV: ICD-10-PCS | Mod: PBBFAC,,, | Performed by: PSYCHIATRY & NEUROLOGY

## 2020-11-11 PROCEDURE — 99244 OFF/OP CNSLTJ NEW/EST MOD 40: CPT | Mod: S$GLB,,, | Performed by: PSYCHIATRY & NEUROLOGY

## 2020-11-11 PROCEDURE — 99999 PR PBB SHADOW E&M-EST. PATIENT-LVL IV: CPT | Mod: PBBFAC,,, | Performed by: PSYCHIATRY & NEUROLOGY

## 2020-11-11 NOTE — TELEPHONE ENCOUNTER
Okay to take HALF of the metoprolol succinate 25mg at night. Did she ever take HALF of the metoprolol? If so when did she increase to a whole 25mg?    Also, I reviewed her neuro visit note and agree she needs a statin, recommend Crestor 10mg daily. Rx has been sent in.     Schedule for follow up within the next 2 weeks or so if not already scheduled.

## 2020-11-11 NOTE — TELEPHONE ENCOUNTER
----- Message from Madiha Mendoza sent at 11/11/2020  1:04 PM CST -----  Contact: self  The patient just started taking metropol 25 MG and her BP 96/60.  Please call back to advise at [phone] (home).  Thanks

## 2020-11-11 NOTE — PROGRESS NOTES
Subjective:       Patient ID: Geovanna Loo is a 55 y.o. female.    Chief Complaint:  Headache      Consultation Requested by:   Sharminreferral Self  No address on file    History of Present Illness  55-year-old female presents for multiple neurologic issues.  Primarily the patient is concerned about her MRI changes.  The MRI was ordered because of her headaches.  The patient notes that she started having headaches that were dull and progressive for the last 2 months.  She notes that they started from her neck and move up to the occiput and to the crown of her head.  She notes that she can get relief from these headaches by leaning her head back but then she notes that her shoulder blades will burn.  She does have a history of migraines but notes that these headaches currently do not feel anything like the migraines that she used to have.  She notes that previously those headaches occurred about 30 years ago and did not have qualities like the headaches that she have now nor were they in location that she has her headaches now.  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  She notes that she has been doing this for decades now.  She notes that she has a significant amount of tension in her neck.  She does have multiple stroke risk factors as well including diabetes type 1, hypertension and dyslipidemia.  Her most recent LDL was 153.  Her  notes that she snores but has not had an evaluation from a selective sleep apnea.  She is not yet taking a baby aspirin.  We have reviewed her MRI at length and appears that the patient has a significant burden, greater than expected for age, of white matter ischemic vascular disease.  We have discussed stroke risk factors at length today.  The patient is not a smoker but has diabetes, hypertension, dyslipidemia and is postmenopausal with some sleep apnea.    Past Medical History:   Diagnosis Date    Diabetes mellitus  type I        Past Surgical History:   Procedure Laterality Date     SECTION      HYSTERECTOMY         Family History   Problem Relation Age of Onset    Cancer Mother     Thyroid disease Mother     Thyroid disease Sister     Thyroid disease Brother        Social History     Socioeconomic History    Marital status:      Spouse name: Not on file    Number of children: Not on file    Years of education: Not on file    Highest education level: Not on file   Occupational History    Not on file   Social Needs    Financial resource strain: Not on file    Food insecurity     Worry: Not on file     Inability: Not on file    Transportation needs     Medical: Not on file     Non-medical: Not on file   Tobacco Use    Smoking status: Never Smoker    Smokeless tobacco: Never Used   Substance and Sexual Activity    Alcohol use: Not Currently    Drug use: Never    Sexual activity: Not Currently   Lifestyle    Physical activity     Days per week: Not on file     Minutes per session: Not on file    Stress: Not on file   Relationships    Social connections     Talks on phone: Not on file     Gets together: Not on file     Attends Latter-day service: Not on file     Active member of club or organization: Not on file     Attends meetings of clubs or organizations: Not on file     Relationship status: Not on file   Other Topics Concern    Not on file   Social History Narrative    Not on file       Review of Systems  Review of Systems   Constitutional: Positive for activity change and fatigue.   Musculoskeletal: Positive for neck pain and neck stiffness.   Neurological: Positive for headaches. Negative for seizures, speech difficulty and weakness.   Psychiatric/Behavioral: Positive for decreased concentration and sleep disturbance.   All other systems reviewed and are negative.      Objective:     Vitals:    20 1053   BP: 97/60   Pulse: 61      Physical Exam  Vitals signs reviewed.    Constitutional:       General: She is not in acute distress.     Appearance: She is well-developed.   HENT:      Head: Normocephalic and atraumatic.      Right Ear: Hearing normal.      Left Ear: Hearing normal.   Eyes:      Extraocular Movements:      Right eye: Normal extraocular motion and no nystagmus.      Left eye: Normal extraocular motion and no nystagmus.      Pupils: Pupils are equal, round, and reactive to light.   Neck:      Musculoskeletal: Normal range of motion and neck supple. Muscular tenderness present. No neck rigidity or spinous process tenderness.      Vascular: No carotid bruit.     Cardiovascular:      Heart sounds: No S4 sounds.    Musculoskeletal: Normal range of motion.   Neurological:      Mental Status: She is alert and oriented to person, place, and time.      GCS: GCS eye subscore is 4. GCS verbal subscore is 5. GCS motor subscore is 6.      Cranial Nerves: No cranial nerve deficit.      Sensory: No sensory deficit.      Motor: No tremor, atrophy or abnormal muscle tone.      Coordination: Coordination normal. Finger-Nose-Finger Test and Heel to Shin Test normal.      Gait: Gait is intact. Gait normal.      Deep Tendon Reflexes: Strength normal and reflexes are normal and symmetric.      Reflex Scores:       Tricep reflexes are 2+ on the right side and 2+ on the left side.       Bicep reflexes are 2+ on the right side and 2+ on the left side.       Brachioradialis reflexes are 2+ on the right side and 2+ on the left side.       Patellar reflexes are 2+ on the right side and 2+ on the left side.       Achilles reflexes are 2+ on the right side and 2+ on the left side.     Comments: Fundoscopic exam shows no papilledema, no hemorrhage, no exudates bilaterally.    Cranial nerves 2-12 are without deficit.   Psychiatric:         Speech: Speech normal.         Behavior: Behavior normal.         Thought Content: Thought content normal.           NEUROLOGICAL EXAMINATION:     MENTAL STATUS    Oriented to person, place, and time.   Attention: normal. Concentration: normal.   Speech: speech is normal   Level of consciousness: alert  Knowledge: good.   Able to name object. Able to read. Able to repeat. Able to write.     CRANIAL NERVES   Cranial nerves II through XII intact.     CN II   Visual fields full to confrontation.   Visual acuity: normal    CN III, IV, VI   Pupils are equal, round, and reactive to light.    MOTOR EXAM   Muscle bulk: normal  Overall muscle tone: normal  Right arm tone: normal  Left arm tone: normal  Right leg tone: normal  Left leg tone: normal    Strength   Strength 5/5 throughout.     REFLEXES     Reflexes   Right brachioradialis: 2+  Left brachioradialis: 2+  Right biceps: 2+  Left biceps: 2+  Right triceps: 2+  Left triceps: 2+  Right patellar: 2+  Left patellar: 2+  Right achilles: 2+  Left achilles: 2+  Right : 2+  Left : 2+  Right plantar: normal  Left plantar: normal    SENSORY EXAM   Light touch normal.   Vibration normal.   Proprioception normal.   Pinprick normal.     GAIT AND COORDINATION     Gait  Gait: normal     Coordination   Finger to nose coordination: normal  Heel to shin coordination: normal   I have spent over 50% of a 45 min visit in guidance, counseling discussion of treatment options.  Assessment/Plan:     Problem List Items Addressed This Visit     None      Visit Diagnoses     Bilateral occipital neuralgia    -  Primary    Cervicogenic headache        Relevant Orders    Ambulatory referral/consult to Physical/Occupational Therapy    Chronic daily headache        Small vessel disease, cerebrovascular        HENRIETTA (obstructive sleep apnea)        Relevant Orders    Ambulatory referral/consult to Sleep Disorders    DDD (degenerative disc disease), cervical        Relevant Orders    Ambulatory referral/consult to Physical/Occupational Therapy        55-year-old female presents for evaluation of multiple neurologic complaints.  The most pressing  complaint is actually her more than expected chronic microvascular ischemic change.  We have discussed stroke risk factors at length today.  I have recommended the patient start taking a baby aspirin 81 mg on a daily basis.  The patient already had a plan discussion with her primary care physician about starting a statin.  I do recommend this plan of action as the patient is last recorded LDL is 153.  Given the amount of cerebrovascular burden she has I do recommend she start a statin with her primary care physician.  As the patient does snore I will make a referral to sleep medicine.  This can be done near where she lives in [place].  I will also give her referral to physical therapy as we have deferred any pharmacologic or injection intervention at this time.  I would like them to address the cervical component of her headache.  At next visit if she has not had benefit from conservative physical therapy then would consider an MRI of the cervical spine versus an EMG/nerve conduction study.  She notes that she has had 3 wrist surgeries in the past and notes that she still has some pain in the wrist and hand.  I suspect that this may have a cervical component that has been more longstanding and is now only manifesting a cervicogenic headache.  I will see her back in about 2 or 3 months but I have explained to the patient expect a message about how physical therapy is going in about a month or so.     The patient verbalizes understanding and agreement with the treatment plan. Questions were sought and answered to her stated verbal satisfaction.        Celeste Reynaga MD    This note is dictated on M*Modal Fluency speech recognition program. There are word recognition mistakes that are occasionally missed on review.

## 2020-11-11 NOTE — TELEPHONE ENCOUNTER
Patient is calling in regards to BP. Patient states that she started BP meds on Monday night and her BP has been low on Tuesday and today. Last reading today was 96/60. Patient complains of dizziness, jittery, and headache. Please advise.

## 2020-11-12 ENCOUNTER — PATIENT MESSAGE (OUTPATIENT)
Dept: FAMILY MEDICINE | Facility: CLINIC | Age: 55
End: 2020-11-12

## 2020-11-12 NOTE — TELEPHONE ENCOUNTER
----- Message from Caroline Esteban sent at 11/12/2020  3:47 PM CST -----  Regarding: advice  Contact: Patient/[phone] (home)  Type: Needs Medical Advice  Who Called:  Patient/[phone] (home)       Additional Information: Her neurologist requested physical therapy. She is asking if the doctor knows of any good place she can refer her to. Please call to advise. He also ordered a sleep study. If the doctor knows about where she can go, also. Please call to advise.

## 2020-11-12 NOTE — TELEPHONE ENCOUNTER
Her insurance my dictate where her sleep study can be done, and what time (at home vs in a sleep center etc.) Looks like Dr. Reynaga ordered the PT and the referral to Sleep Disorders. See below:    Visit Diagnoses      Bilateral occipital neuralgia    -  Primary     Cervicogenic headache         Relevant Orders     Ambulatory referral/consult to Physical/Occupational Therapy     Chronic daily headache         Small vessel disease, cerebrovascular         HENRIETTA (obstructive sleep apnea)         Relevant Orders     Ambulatory referral/consult to Sleep Disorders     DDD (degenerative disc disease), cervical         Relevant Orders     Ambulatory referral/consult to Physical/Occupational Therapy     [facility] has PT in [place]. There is also PT available in [place] and [place] I believe. She should be able to go wherever she would like as long as she has an order (which has been placed--see visit note from 11-.)    As far as the sleep study, looks like he referred her to a sleep disorders specialist. She can contact neurology office for further information, as I am not sure where or with whom that would be. There might be a sleep specialist provider in [place], though I am not sure.

## 2020-11-14 ENCOUNTER — LAB VISIT (OUTPATIENT)
Dept: LAB | Facility: HOSPITAL | Age: 55
End: 2020-11-14
Attending: INTERNAL MEDICINE
Payer: COMMERCIAL

## 2020-11-14 DIAGNOSIS — E11.42 DIABETIC POLYNEUROPATHY: Primary | ICD-10-CM

## 2020-11-14 LAB
C PEPTIDE SERPL-MCNC: 2.09 NG/ML (ref 0.78–5.19)
FSH SERPL-ACNC: 41.7 MIU/ML
GLUCOSE SERPL-MCNC: 146 MG/DL (ref 70–110)
T4 FREE SERPL-MCNC: 0.77 NG/DL (ref 0.71–1.51)
TSH SERPL DL<=0.005 MIU/L-ACNC: 0.55 UIU/ML (ref 0.34–5.6)

## 2020-11-14 PROCEDURE — 84681 ASSAY OF C-PEPTIDE: CPT

## 2020-11-14 PROCEDURE — 36415 COLL VENOUS BLD VENIPUNCTURE: CPT

## 2020-11-14 PROCEDURE — 84439 ASSAY OF FREE THYROXINE: CPT

## 2020-11-14 PROCEDURE — 82947 ASSAY GLUCOSE BLOOD QUANT: CPT

## 2020-11-14 PROCEDURE — 83001 ASSAY OF GONADOTROPIN (FSH): CPT

## 2020-11-14 PROCEDURE — 84443 ASSAY THYROID STIM HORMONE: CPT

## 2020-11-16 ENCOUNTER — PATIENT MESSAGE (OUTPATIENT)
Dept: NEUROLOGY | Facility: CLINIC | Age: 55
End: 2020-11-16

## 2020-11-17 ENCOUNTER — TELEPHONE (OUTPATIENT)
Dept: FAMILY MEDICINE | Facility: CLINIC | Age: 55
End: 2020-11-17

## 2020-11-17 ENCOUNTER — PATIENT MESSAGE (OUTPATIENT)
Dept: FAMILY MEDICINE | Facility: CLINIC | Age: 55
End: 2020-11-17

## 2020-11-17 ENCOUNTER — PATIENT MESSAGE (OUTPATIENT)
Dept: NEUROLOGY | Facility: CLINIC | Age: 55
End: 2020-11-17

## 2020-11-17 ENCOUNTER — PATIENT MESSAGE (OUTPATIENT)
Dept: ENDOCRINOLOGY | Facility: CLINIC | Age: 55
End: 2020-11-17

## 2020-11-17 NOTE — TELEPHONE ENCOUNTER
----- Message from Stacy Tyler MA sent at 11/17/2020  4:11 PM CST -----  Regarding: refill  Contact: patient  Type:  RX Refill Request    Who Called:  patient   Refill or New Rx:  new script   RX Name and Strength:  for cholesterol per patient   How is the patient currently taking it? (ex. 1XDay):    Is this a 30 day or 90 day RX:  30  Preferred Pharmacy with phone number:  [phone] (home)     Local or Mail Order:  local   Ordering Provider:  Esha Wilburn Call Back Number:  [phone] (home)     Additional Information:

## 2020-11-17 NOTE — TELEPHONE ENCOUNTER
Results for orders placed or performed in visit on 11/14/20   TSH   Result Value Ref Range    TSH 0.552 0.340 - 5.600 uIU/mL   T4, free   Result Value Ref Range    Free T4 0.77 0.71 - 1.51 ng/dL   Glucose, random   Result Value Ref Range    Glucose 146 (H) 70 - 110 mg/dL   C-peptide   Result Value Ref Range    C-Peptide 2.09 0.78 - 5.19 ng/mL   Follicle stimulating hormone   Result Value Ref Range    Follicle Stimulating Hormone 41.70 See Text mIU/mL     FSH normal for postmenopausal woman   TSH and free T4 normal     C peptide is detectable with glucose of 146.     Not using VGo regularly but last A1C was in good range.     Hemoglobin A1C   Date Value Ref Range Status   10/05/2020 7.1 (H) 4.5 - 6.2 % Final     Comment:     According to ADA guidelines, hemoglobin A1C <7.0% represents  optimal control in non-pregnant diabetic patients.  Different  metrics may apply to specific populations.   Standards of Medical Care in Diabetes - 2016.  For the purpose of screening for the presence of diabetes:  <5.7%     Consistent with the absence of diabetes  5.7-6.4%  Consistent with increasing risk for diabetes   (prediabetes)  >or=6.5%  Consistent with diabetes  Currently no consensus exists for use of hemoglobin A1C  for diagnosis of diabetes for children.

## 2020-11-17 NOTE — TELEPHONE ENCOUNTER
Patient states that cholesterol meds were discussed at visit, however no meds have been called in. Patient requesting script for cholesterol meds

## 2020-11-18 ENCOUNTER — PATIENT MESSAGE (OUTPATIENT)
Dept: FAMILY MEDICINE | Facility: CLINIC | Age: 55
End: 2020-11-18

## 2020-11-18 RX ORDER — CITALOPRAM 40 MG/1
40 TABLET, FILM COATED ORAL DAILY
Qty: 90 TABLET | Refills: 0 | Status: SHIPPED | OUTPATIENT
Start: 2020-11-18 | End: 2021-02-09

## 2020-11-18 RX ORDER — ROSUVASTATIN CALCIUM 10 MG/1
10 TABLET, COATED ORAL DAILY
Qty: 90 TABLET | Refills: 0 | Status: SHIPPED | OUTPATIENT
Start: 2020-11-18 | End: 2021-11-18

## 2020-11-18 NOTE — TELEPHONE ENCOUNTER
Patient's last visit with me on 10-15-20. I will send in Crestor, as I did suggest it during the last MyChart communication. I will also increase her Celexa to 40mg as requested. She will need an appointment around 4 weeks for follow up on med changes. Please notify and schedule.

## 2020-12-03 ENCOUNTER — CLINICAL SUPPORT (OUTPATIENT)
Dept: ENDOCRINOLOGY | Facility: CLINIC | Age: 55
End: 2020-12-03
Payer: COMMERCIAL

## 2020-12-03 DIAGNOSIS — E11.42 DIABETIC PERIPHERAL NEUROPATHY: ICD-10-CM

## 2020-12-03 NOTE — PROGRESS NOTES
"DIABETES EDUCATOR NOTE   PLACEMENT OF DEXCOM G6 PRO SENSOR  CONTINOUS GLUCOSE MONITORING SYSTEM (CGMS)     Patient is here in clinic today for placement of continuous glucose monitoring sensor.                Each patient verified that they were here for CGMS procedure ordered by their provider and that they have a working glucose meter and supplies at home.   Patient will be provided with a Dexcom G6 Pro sensor, transmitter, and a copy of the Continuous Glucose Monitoring Patient Log to fill out during the study.              A detailed  explanation of Continuous Glucose Monitoring was  provided. Patient informed that this is a blind procedure and that they will not actually see the blood sugar tracing in real time.    Instructed patient to check blood sugar using home glucometer and to record the following on provided patient log sheets:Blood sugar taken at home, Meals and snacks, Activity, and Diabetes medications taken and dosage               Patient was brought to a private location.  Site selected and prepared and allowed to dry. Glucose Transmitter Serial Number [id number]  was inserted to patient's abdomen.               The following forms  were given and reviewed in detail with patient and all questions answered.   · Continuous Glucose Monitoring Patient Log   · Dexcom G6 PRO Patient Handout "Blinded CGM Patient Handout"                 Instructions: Time: 15 min   Insertion of sensor:  Time: 5 minutes     "

## 2020-12-04 ENCOUNTER — PATIENT MESSAGE (OUTPATIENT)
Dept: NEUROLOGY | Facility: CLINIC | Age: 55
End: 2020-12-04

## 2020-12-04 ENCOUNTER — PATIENT MESSAGE (OUTPATIENT)
Dept: FAMILY MEDICINE | Facility: CLINIC | Age: 55
End: 2020-12-04

## 2020-12-04 ENCOUNTER — NURSE TRIAGE (OUTPATIENT)
Dept: ADMINISTRATIVE | Facility: CLINIC | Age: 55
End: 2020-12-04

## 2020-12-04 NOTE — TELEPHONE ENCOUNTER
Pt calling regarding low BP of low 100s/60s. Current BP is 124/80. Denies SOB. +chest pain and dizziness. Per protocol, advised pt to call 911 now.    Reason for Disposition   Chest pain    Additional Information   Negative: Systolic BP < 90 and feeling dizzy, lightheaded, or weak   Negative: Started suddenly after an allergic medicine, an allergic food, or bee sting   Negative: Shock suspected (e.g., cold/pale/clammy skin, too weak to stand, low BP, rapid pulse)   Negative: Difficult to awaken or acting confused  (e.g., disoriented, slurred speech)   Negative: Fainted    Protocols used: LOW BLOOD PRESSURE-A-OH

## 2020-12-07 ENCOUNTER — PATIENT MESSAGE (OUTPATIENT)
Dept: ENDOCRINOLOGY | Facility: CLINIC | Age: 55
End: 2020-12-07

## 2020-12-09 ENCOUNTER — TELEPHONE (OUTPATIENT)
Dept: FAMILY MEDICINE | Facility: CLINIC | Age: 55
End: 2020-12-09

## 2020-12-09 ENCOUNTER — CLINICAL SUPPORT (OUTPATIENT)
Dept: ENDOCRINOLOGY | Facility: CLINIC | Age: 55
End: 2020-12-09
Payer: COMMERCIAL

## 2020-12-09 PROCEDURE — 95251 PR GLUCOSE MONITOR, 72 HOUR, PHYS INTERP: ICD-10-PCS | Mod: S$GLB,,, | Performed by: INTERNAL MEDICINE

## 2020-12-09 PROCEDURE — 95250 CONT GLUC MNTR PHYS/QHP EQP: CPT | Mod: S$GLB,,, | Performed by: INTERNAL MEDICINE

## 2020-12-09 PROCEDURE — 95250 PR GLUCOSE MONITORING,72 HRS,SUB-Q SENSOR: ICD-10-PCS | Mod: S$GLB,,, | Performed by: INTERNAL MEDICINE

## 2020-12-09 PROCEDURE — 95251 CONT GLUC MNTR ANALYSIS I&R: CPT | Mod: S$GLB,,, | Performed by: INTERNAL MEDICINE

## 2020-12-09 NOTE — TELEPHONE ENCOUNTER
Calling pt about DM eye exam. She stated that Rosmery Wilburn had done it and she had eye camera take her picture of her eyes there as well. Called [facility] to ask for eye exam again.

## 2020-12-10 ENCOUNTER — OFFICE VISIT (OUTPATIENT)
Dept: FAMILY MEDICINE | Facility: CLINIC | Age: 55
End: 2020-12-10
Payer: COMMERCIAL

## 2020-12-10 VITALS
HEART RATE: 68 BPM | TEMPERATURE: 98 F | WEIGHT: 191.81 LBS | OXYGEN SATURATION: 98 % | DIASTOLIC BLOOD PRESSURE: 71 MMHG | SYSTOLIC BLOOD PRESSURE: 120 MMHG | BODY MASS INDEX: 31.96 KG/M2 | HEIGHT: 65 IN

## 2020-12-10 DIAGNOSIS — I10 ESSENTIAL HYPERTENSION: Primary | ICD-10-CM

## 2020-12-10 DIAGNOSIS — E55.9 VITAMIN D DEFICIENCY: ICD-10-CM

## 2020-12-10 DIAGNOSIS — E78.2 MIXED HYPERLIPIDEMIA: ICD-10-CM

## 2020-12-10 DIAGNOSIS — E53.8 B12 DEFICIENCY: ICD-10-CM

## 2020-12-10 DIAGNOSIS — E03.9 HYPOTHYROIDISM (ACQUIRED): ICD-10-CM

## 2020-12-10 DIAGNOSIS — G44.209 MUSCLE TENSION HEADACHE: ICD-10-CM

## 2020-12-10 PROCEDURE — 99999 PR PBB SHADOW E&M-EST. PATIENT-LVL V: ICD-10-PCS | Mod: PBBFAC,,, | Performed by: FAMILY MEDICINE

## 2020-12-10 PROCEDURE — 3008F PR BODY MASS INDEX (BMI) DOCUMENTED: ICD-10-PCS | Mod: S$GLB,,, | Performed by: FAMILY MEDICINE

## 2020-12-10 PROCEDURE — 99215 OFFICE O/P EST HI 40 MIN: CPT | Mod: S$GLB,,, | Performed by: FAMILY MEDICINE

## 2020-12-10 PROCEDURE — 1126F AMNT PAIN NOTED NONE PRSNT: CPT | Mod: S$GLB,,, | Performed by: FAMILY MEDICINE

## 2020-12-10 PROCEDURE — 3008F BODY MASS INDEX DOCD: CPT | Mod: S$GLB,,, | Performed by: FAMILY MEDICINE

## 2020-12-10 PROCEDURE — 1126F PR PAIN SEVERITY QUANTIFIED, NO PAIN PRESENT: ICD-10-PCS | Mod: S$GLB,,, | Performed by: FAMILY MEDICINE

## 2020-12-10 PROCEDURE — 99999 PR PBB SHADOW E&M-EST. PATIENT-LVL V: CPT | Mod: PBBFAC,,, | Performed by: FAMILY MEDICINE

## 2020-12-10 PROCEDURE — 99215 PR OFFICE/OUTPT VISIT, EST, LEVL V, 40-54 MIN: ICD-10-PCS | Mod: S$GLB,,, | Performed by: FAMILY MEDICINE

## 2020-12-10 PROCEDURE — 3051F PR MOST RECENT HEMOGLOBIN A1C LEVEL 7.0 - < 8.0%: ICD-10-PCS | Mod: S$GLB,,, | Performed by: FAMILY MEDICINE

## 2020-12-10 PROCEDURE — 3051F HG A1C>EQUAL 7.0%<8.0%: CPT | Mod: S$GLB,,, | Performed by: FAMILY MEDICINE

## 2020-12-10 RX ORDER — BACLOFEN 10 MG/1
10 TABLET ORAL 3 TIMES DAILY PRN
Qty: 90 TABLET | Refills: 0 | Status: SHIPPED | OUTPATIENT
Start: 2020-12-10 | End: 2021-01-02 | Stop reason: SDUPTHER

## 2020-12-10 RX ORDER — LISINOPRIL AND HYDROCHLOROTHIAZIDE 12.5; 2 MG/1; MG/1
2 TABLET ORAL NIGHTLY
Qty: 180 TABLET | Refills: 3 | Status: SHIPPED | OUTPATIENT
Start: 2020-12-10 | End: 2021-12-10

## 2020-12-10 NOTE — PROGRESS NOTES
"    Answers for HPI/ROS submitted by the patient on 12/7/2020   activity change: No  neck pain: No  hearing loss: No  rhinorrhea: Yes  trouble swallowing: No  eye discharge: No  visual disturbance: No  chest tightness: Yes  wheezing: No  chest pain: Yes  palpitations: No  blood in stool: No  constipation: No  vomiting: No  diarrhea: No  polydipsia: No  polyuria: No  difficulty urinating: No  hematuria: No  menstrual problem: No  dysuria: No  joint swelling: Yes  arthralgias: Yes  headaches: Yes  weakness: Yes  confusion: Yes  dysphoric mood: Yes    Subjective:       Patient ID: Geovanna Loo is a 55 y.o. female.    Chief Complaint: Hospital Follow Up (chest pain)    Appointment Monday with Dr. Almaraz, cardiologist, on Monday (next week.) She questions why she needs a cardiologist "if they told me my heart was fine and it was just muscle spasm." Patient also has had labile HTN with several ER visits in the last few months. Has had several episodes of chest pain that sent her to the ER in the last few months as well. She has only been to [facility] ER.    IDDM--seeing Dr. Hernandez (avis) at [facility].    HTN--Initially was fluctuating dramatically, but has been more stable. Checks daily or more often.    Neck and shoulder tension--has been seen by physical therapy once, and she has been doing home exercises.      Review of Systems   Constitutional: Negative for activity change.   HENT: Positive for rhinorrhea. Negative for hearing loss and trouble swallowing.    Eyes: Negative for discharge and visual disturbance.   Respiratory: Positive for chest tightness. Negative for wheezing.    Cardiovascular: Positive for chest pain. Negative for palpitations.   Gastrointestinal: Negative for blood in stool, constipation, diarrhea and vomiting.   Endocrine: Negative for polydipsia and polyuria.   Genitourinary: Negative for difficulty urinating, dysuria, hematuria and menstrual problem.   Musculoskeletal: Positive for arthralgias " "and joint swelling. Negative for neck pain.   Neurological: Positive for weakness and headaches.   Psychiatric/Behavioral: Positive for dysphoric mood. Negative for confusion.   All other systems reviewed and are negative.        Past Medical History:   Diagnosis Date    Diabetes mellitus type I      Past Surgical History:   Procedure Laterality Date     SECTION      HYSTERECTOMY       Family History   Problem Relation Age of Onset    Cancer Mother     Thyroid disease Mother     Thyroid disease Sister     Thyroid disease Brother        Review of patient's allergies indicates:   Allergen Reactions    Basaglar kwikpen u-100 insulin [insulin glargine] Swelling     THROAT SWELLS    Amlodipine Nausea Only     Stomach pain    Aspirin Nausea And Vomiting       Current Outpatient Medications:     BD INSULIN SYRINGE ULTRA-FINE 0.5 mL 31 gauge x 5/16" Syrg, USE AS DIRECTED FOR INSULIN ADMINISTRATION, Disp: , Rfl:     cholecalciferol, vitamin D3, (DECARA) 1,250 mcg (50,000 unit) capsule, Take 1 capsule (50,000 Units total) by mouth once a week. Take with a meal or with a spoonful of peanut butter for low vitamin D. for 12 doses, Disp: 12 capsule, Rfl: 0    citalopram (CELEXA) 40 MG tablet, Take 1 tablet (40 mg total) by mouth once daily., Disp: 90 tablet, Rfl: 0    cyanocobalamin 1,000 mcg/mL injection, Inject 1 mL (1,000 mcg total) into the muscle once a week., Disp: 10 mL, Rfl: 1    insulin aspart U-100 (NOVOLOG U-100 INSULIN ASPART) 100 unit/mL injection, Use as directed with insulin pump (V GO 30.), Disp: 5 vial, Rfl: 2    levothyroxine (SYNTHROID) 75 MCG tablet, Take 75 mcg by mouth before breakfast., Disp: , Rfl:     lisinopriL-hydrochlorothiazide (PRINZIDE,ZESTORETIC) 20-12.5 mg per tablet, Take 2 tablets by mouth every evening., Disp: 180 tablet, Rfl: 3    nitroGLYCERIN (NITROSTAT) 0.4 MG SL tablet, Place 1 tablet (0.4 mg total) under the tongue every 5 (five) minutes as needed for Chest " "pain., Disp: 25 tablet, Rfl: 0    ONETOUCH VERIO TEST STRIPS Strp, CHECK GLUCOSE TWICE A DAY, Disp: , Rfl:     rosuvastatin (CRESTOR) 10 MG tablet, Take 1 tablet (10 mg total) by mouth once daily., Disp: 90 tablet, Rfl: 0    V-GO 30 Marcy, USE 4 CLICKS FOR MEALS, 1 CLICK FOR SNACKS, Disp: , Rfl:     baclofen (LIORESAL) 10 MG tablet, Take 1 tablet (10 mg total) by mouth 3 (three) times daily as needed (for muscle spasm (can try HALF tablet at first))., Disp: 90 tablet, Rfl: 0    ergocalciferol, vitamin D2, (VITAMIN D2 ORAL), Take 50 mg by mouth Daily., Disp: , Rfl:     furosemide (LASIX) 20 MG tablet, Take 10 mg by mouth daily as needed., Disp: , Rfl:     insulin syringe-needle U-100 (INSULIN SYRINGE) 0.5 mL 29 gauge x 1/2" Syrg, Use as directed for insulin administration., Disp: 100 each, Rfl: 5    metoprolol succinate (TOPROL-XL) 25 MG 24 hr tablet, Take 1 tablet (25 mg total) by mouth every evening., Disp: 90 tablet, Rfl: 0    traMADoL (ULTRAM) 50 mg tablet, Take 50 mg by mouth as needed for Pain., Disp: , Rfl:       Objective:      /71   Pulse 68   Temp 97.9 °F (36.6 °C) (Tympanic)   Ht 5' 5" (1.651 m)   Wt 87 kg (191 lb 12.8 oz)   SpO2 98%   BMI 31.92 kg/m²   Physical Exam  Vitals signs and nursing note reviewed.   Constitutional:       General: She is not in acute distress.     Appearance: Normal appearance. She is well-developed. She is obese. She is not ill-appearing, toxic-appearing or diaphoretic.   HENT:      Head: Normocephalic and atraumatic.      Right Ear: External ear normal.      Left Ear: External ear normal.      Nose: Nose normal. No congestion.      Mouth/Throat:      Mouth: Mucous membranes are moist.      Pharynx: Oropharynx is clear. No oropharyngeal exudate.   Eyes:      General: No scleral icterus.        Right eye: No discharge.         Left eye: No discharge.      Extraocular Movements: Extraocular movements intact.      Conjunctiva/sclera: Conjunctivae normal.      " Pupils: Pupils are equal, round, and reactive to light.   Neck:      Musculoskeletal: Normal range of motion and neck supple. No neck rigidity or muscular tenderness.      Thyroid: No thyromegaly.      Vascular: No carotid bruit or JVD.      Trachea: No tracheal deviation.   Cardiovascular:      Rate and Rhythm: Normal rate and regular rhythm.      Pulses: Normal pulses.      Heart sounds: Normal heart sounds. No murmur. No friction rub. No gallop.    Pulmonary:      Effort: Pulmonary effort is normal. No respiratory distress.      Breath sounds: Normal breath sounds. No wheezing, rhonchi or rales.   Chest:      Chest wall: No tenderness.   Abdominal:      General: Bowel sounds are normal. There is no distension.      Palpations: Abdomen is soft. There is no mass.      Tenderness: There is no abdominal tenderness. There is no right CVA tenderness, left CVA tenderness, guarding or rebound.   Musculoskeletal: Normal range of motion.         General: No swelling.      Right lower leg: No edema.      Left lower leg: No edema.   Lymphadenopathy:      Cervical: No cervical adenopathy.   Skin:     General: Skin is warm and dry.      Capillary Refill: Capillary refill takes less than 2 seconds.      Coloration: Skin is not jaundiced or pale.      Findings: No bruising, erythema or rash.   Neurological:      General: No focal deficit present.      Mental Status: She is alert and oriented to person, place, and time. Mental status is at baseline.      Cranial Nerves: No cranial nerve deficit.      Motor: No weakness.      Coordination: Coordination normal.      Gait: Gait normal.      Deep Tendon Reflexes: Reflexes normal.   Psychiatric:         Mood and Affect: Mood normal.         Behavior: Behavior normal.         Thought Content: Thought content normal.         Judgment: Judgment normal.         Assessment:       1. Essential hypertension    2. Diabetes, type 1.5, uncontrolled, managed as type 1    3. Hypothyroidism  (acquired)    4. B12 deficiency    5. Vitamin D deficiency    6. Mixed hyperlipidemia    7. Muscle tension headache        Plan:       Essential hypertension  -     lisinopriL-hydrochlorothiazide (PRINZIDE,ZESTORETIC) 20-12.5 mg per tablet; Take 2 tablets by mouth every evening.  Dispense: 180 tablet; Refill: 3    Diabetes, type 1.5, uncontrolled, managed as type 1  -     CBC Auto Differential; Future; Expected date: 01/20/2021  -     Comprehensive Metabolic Panel; Future; Expected date: 01/20/2021  -     Hemoglobin A1C; Future; Expected date: 01/20/2021    Hypothyroidism (acquired)  -     TSH; Future; Expected date: 01/20/2021  -     T4, Free; Future; Expected date: 01/20/2021    B12 deficiency  -     Vitamin B12; Future; Expected date: 01/20/2021    Vitamin D deficiency  -     Vitamin D; Future; Expected date: 01/20/2021    Mixed hyperlipidemia  -     Lipid Panel; Future; Expected date: 01/20/2021    Muscle tension headache  -     baclofen (LIORESAL) 10 MG tablet; Take 1 tablet (10 mg total) by mouth 3 (three) times daily as needed (for muscle spasm (can try HALF tablet at first)).  Dispense: 90 tablet; Refill: 0    Strict return precautions reviewed and patient verbalized understanding. Risks, benefits, and alternatives to the plan were reviewed in detail and all questions answered to the patient's satisfaction. 40 minutes were spent with patient, >50% of time based on counseling and coordination of care.    Outside records reviewed.    I have encouraged her to get her [facility] records for cardiologist's review and for my review.        Follow up in about 2 months (around 2/10/2021) for for lab follow up.

## 2020-12-11 ENCOUNTER — PATIENT MESSAGE (OUTPATIENT)
Dept: FAMILY MEDICINE | Facility: CLINIC | Age: 55
End: 2020-12-11

## 2020-12-13 ENCOUNTER — PATIENT OUTREACH (OUTPATIENT)
Dept: ADMINISTRATIVE | Facility: OTHER | Age: 55
End: 2020-12-13

## 2020-12-14 ENCOUNTER — OFFICE VISIT (OUTPATIENT)
Dept: CARDIOLOGY | Facility: CLINIC | Age: 55
End: 2020-12-14
Payer: COMMERCIAL

## 2020-12-14 VITALS
TEMPERATURE: 98 F | RESPIRATION RATE: 14 BRPM | DIASTOLIC BLOOD PRESSURE: 69 MMHG | HEIGHT: 65 IN | SYSTOLIC BLOOD PRESSURE: 104 MMHG | HEART RATE: 72 BPM | OXYGEN SATURATION: 96 % | BODY MASS INDEX: 31.82 KG/M2 | WEIGHT: 191 LBS

## 2020-12-14 DIAGNOSIS — G47.19 EXCESSIVE DAYTIME SLEEPINESS: ICD-10-CM

## 2020-12-14 DIAGNOSIS — Z91.89 CARDIOVASCULAR EVENT RISK: ICD-10-CM

## 2020-12-14 DIAGNOSIS — E10.3292 MILD NONPROLIFERATIVE DIABETIC RETINOPATHY OF LEFT EYE WITHOUT MACULAR EDEMA ASSOCIATED WITH TYPE 1 DIABETES MELLITUS: ICD-10-CM

## 2020-12-14 DIAGNOSIS — R07.89 CHEST PRESSURE: ICD-10-CM

## 2020-12-14 DIAGNOSIS — E65 ABDOMINAL OBESITY: ICD-10-CM

## 2020-12-14 DIAGNOSIS — I20.89 STABLE ANGINA PECTORIS: Primary | ICD-10-CM

## 2020-12-14 DIAGNOSIS — G45.9 MINI STROKE: ICD-10-CM

## 2020-12-14 DIAGNOSIS — E78.00 HYPERCHOLESTEROLEMIA: ICD-10-CM

## 2020-12-14 DIAGNOSIS — E66.09 CLASS 1 OBESITY DUE TO EXCESS CALORIES WITH SERIOUS COMORBIDITY AND BODY MASS INDEX (BMI) OF 31.0 TO 31.9 IN ADULT: ICD-10-CM

## 2020-12-14 DIAGNOSIS — H81.13 BENIGN PAROXYSMAL POSITIONAL VERTIGO DUE TO BILATERAL VESTIBULAR DISORDER: ICD-10-CM

## 2020-12-14 DIAGNOSIS — I10 ESSENTIAL HYPERTENSION: ICD-10-CM

## 2020-12-14 PROBLEM — E11.319 DIABETIC RETINOPATHY: Status: ACTIVE | Noted: 2020-12-14

## 2020-12-14 PROCEDURE — 99999 PR PBB SHADOW E&M-EST. PATIENT-LVL V: ICD-10-PCS | Mod: PBBFAC,,, | Performed by: INTERNAL MEDICINE

## 2020-12-14 PROCEDURE — 3008F PR BODY MASS INDEX (BMI) DOCUMENTED: ICD-10-PCS | Mod: S$GLB,,, | Performed by: INTERNAL MEDICINE

## 2020-12-14 PROCEDURE — 3008F BODY MASS INDEX DOCD: CPT | Mod: S$GLB,,, | Performed by: INTERNAL MEDICINE

## 2020-12-14 PROCEDURE — 99245 PR OFFICE CONSULTATION,LEVEL V: ICD-10-PCS | Mod: S$GLB,,, | Performed by: INTERNAL MEDICINE

## 2020-12-14 PROCEDURE — 99245 OFF/OP CONSLTJ NEW/EST HI 55: CPT | Mod: S$GLB,,, | Performed by: INTERNAL MEDICINE

## 2020-12-14 PROCEDURE — 99999 PR PBB SHADOW E&M-EST. PATIENT-LVL V: CPT | Mod: PBBFAC,,, | Performed by: INTERNAL MEDICINE

## 2020-12-14 RX ORDER — METHOCARBAMOL 500 MG/1
TABLET, FILM COATED ORAL
COMMUNITY
Start: 2020-12-05

## 2020-12-14 NOTE — LETTER
December 14, 2020      Diann Goodson MD  [address] [place] [address]           [facility]  [address] [place] [address]  Phone: [phone]  Fax: [phone]          Patient: Geovanna Loo   MR Number: [id number]   YOB: 1965   Date of Visit: 12/14/2020       Dear Dr. Diann Goodson:    Thank you for referring Geovanna Loo to me for evaluation. Attached you will find relevant portions of my assessment and plan of care.    If you have questions, please do not hesitate to call me. I look forward to following Geovanna Loo along with you.    Sincerely,    Dorian Almaraz MD    Enclosure  CC:  No Recipients    If you would like to receive this communication electronically, please contact [email or web address] or [phone] to request more information on [employer] access.    For providers and/or their staff who would like to refer a patient to [facility], please contact us through our one-stop-shop provider referral line, [facility] , at [phone].    If you feel you have received this communication in error or would no longer like to receive these types of communications, please e-mail [email or web address]

## 2020-12-14 NOTE — PROGRESS NOTES
Subjective:    Patient ID:  Geovanna Loo is a 55 y.o. female who presents for evaluation of Establish Care  For recurrent chest pressure over the past 10 years, 5 hospital visit and normal angiogram in early 2019, not well controlled T1DM on insulin pump, HTN, post DC from [facility] 12/4/2020  PCP and referred by Dr. Solo  Prior cardiologist in [place]  Neurologist: Dr. ORTEGA Reynaga, main campus  Lives with , Rainer, outdoor smoker, have 11 yo son  Full time  for PC board. Mostly sedentary    Patient is a new patient to me.    Health literacy: medium  Vaccinations: up-to-date   Activities: active, no regular exercise, no time, do housework and childcare  Nicotine: never  Alcohol: none  Illicit drugs: none  Cardiac symptoms: atypical chest pressure  Home BP: 105/65  Medication compliance: yes, on Lisinopril HCT 20-12.5, 2 tabs in the evening, could not take metoprolol but can not recall the reason  Diet: regular  Caffeine: 2 cpd  Labs:   Lab Results   Component Value Date    TSH 0.552 11/14/2020        Lab Results   Component Value Date    HGBA1C 7.1 (H) 10/05/2020      Urine negative for protein    Lab Results   Component Value Date    WBC 8.37 10/05/2020    HGB 12.3 10/05/2020    HCT 38.8 10/05/2020    MCV 88 10/05/2020     10/05/2020       CMP  Sodium   Date Value Ref Range Status   10/05/2020 138 136 - 145 mmol/L Final     Potassium   Date Value Ref Range Status   10/05/2020 4.2 3.5 - 5.1 mmol/L Final     Chloride   Date Value Ref Range Status   10/05/2020 102 95 - 110 mmol/L Final     CO2   Date Value Ref Range Status   10/05/2020 28 23 - 29 mmol/L Final     Glucose   Date Value Ref Range Status   11/14/2020 146 (H) 70 - 110 mg/dL Final     BUN   Date Value Ref Range Status   10/05/2020 16 6 - 20 mg/dL Final     Creatinine   Date Value Ref Range Status   10/05/2020 0.4 (L) 0.5 - 1.4 mg/dL Final     Calcium   Date Value Ref Range Status   10/05/2020 8.8 8.7 - 10.5 mg/dL  "Final     Total Protein   Date Value Ref Range Status   10/05/2020 6.9 6.0 - 8.4 g/dL Final     Albumin   Date Value Ref Range Status   10/05/2020 3.8 3.5 - 5.2 g/dL Final     Total Bilirubin   Date Value Ref Range Status   10/05/2020 0.4 0.1 - 1.0 mg/dL Final     Comment:     For infants and newborns, interpretation of results should be based  on gestational age, weight and in agreement with clinical  observations.  Premature Infant recommended reference ranges:  Up to 24 hours.............<8.0 mg/dL  Up to 48 hours............<12.0 mg/dL  3-5 days..................<15.0 mg/dL  6-29 days.................<15.0 mg/dL       Alkaline Phosphatase   Date Value Ref Range Status   10/05/2020 74 55 - 135 U/L Final     AST   Date Value Ref Range Status   10/05/2020 21 10 - 40 U/L Final     ALT   Date Value Ref Range Status   10/05/2020 21 10 - 44 U/L Final     Anion Gap   Date Value Ref Range Status   10/05/2020 8 8 - 16 mmol/L Final     eGFR if    Date Value Ref Range Status   10/05/2020 >60.0 >60 mL/min/1.73 m^2 Final     eGFR if non    Date Value Ref Range Status   10/05/2020 >60.0 >60 mL/min/1.73 m^2 Final     Comment:     Calculation used to obtain the estimated glomerular filtration  rate (eGFR) is the CKD-EPI equation.        @labrcntip(troponini)@  No results found for: BNP}   Lab Results   Component Value Date    CHOL 215 (H) 10/05/2020     Lab Results   Component Value Date    HDL 46 10/05/2020     Lab Results   Component Value Date    LDLCALC 153.6 10/05/2020     Lab Results   Component Value Date    TRIG 77 10/05/2020         Lab Results   Component Value Date    CHOLHDL 21.4 10/05/2020      Last Echo: 2020  Last stress test: SE 2020  Cardiovascular angiogram: early , "no blockages"  EC2020 SB, 53, left axis, minimal voltage for LVH  Fundoscopic exam: within the past year, postive for DM retinopathy    WF with long standing atypical chest pressure for the past 10 " "years, recall 9-10 Echo stress tests, all negative, [facility] in early 2019 showed no blockages. Continue with chest pressure daily for past 3 months, longest episodes about 5 minutes with occasional radiation to the left shoulder. Given NTG to use when CP last > 5 minutes; therefore have not tried it. Have a number of ASCVD risk factors, see Problem List. No premature family history for CAD nor stroke. Had recent brain MRI for headaches and found likely small vessel problem.     Dr. Reynaga noted "55-year-old female presents for multiple neurologic issues. Primarily the patient is concerned about her MRI changes. The MRI was ordered because of her headaches. The patient notes that she started having headaches that were dull and progressive for the last 2 months. She does have multiple stroke risk factors as well including diabetes type 1, hypertension and dyslipidemia. Her most recent LDL was 153 We have reviewed her MRI at length and appears that the patient has a significant burden, greater than expected for age, of white matter ischemic vascular disease. recommended the patient start taking a baby aspirin 81 mg on a daily basis. The patient already had a plan discussion with her primary care physician about starting a statin. As the patient does snore I will make a referral to sleep medicine." Sleep study is pending.    Echo stress test - There is left ventricular concentric remodeling.  The left ventricle is normal in size with normal systolic function. The estimated ejection fraction is 66%.  Normal left ventricular diastolic function.  Normal right ventricular systolic function.  Mild right atrial enlargement.  The stress echo portion of this study is negative for myocardial ischemia.  Mild left atrial enlargement.  The test was stopped because the patient experienced shortness of breath. The patient reached the end of the protocol.  The patient's exercise capacity was mildly impaired. Completed 5.7 METS.  During " stress, the following significant arrhythmias were observed: occasional PVCs.  The ECG portion of this study is negative for myocardial ischemia.    Review of Systems   Constitution: Positive for chills and malaise/fatigue. Negative for diaphoresis, fever, night sweats and weight gain.   HENT: Negative for nosebleeds and tinnitus.    Eyes: Positive for blurred vision and photophobia. Negative for visual disturbance.   Cardiovascular: Positive for chest pain. Negative for claudication, cyanosis, dyspnea on exertion, irregular heartbeat, leg swelling, near-syncope, orthopnea, palpitations and paroxysmal nocturnal dyspnea.   Respiratory: Positive for snoring. Negative for cough, shortness of breath, sleep disturbances due to breathing and wheezing.         [place] score 8, sleep test pending   Endocrine: Positive for cold intolerance and heat intolerance. Negative for polydipsia and polyuria.   Hematologic/Lymphatic: Does not bruise/bleed easily.   Skin: Negative for color change, flushing, nail changes, poor wound healing and suspicious lesions.   Musculoskeletal: Positive for arthritis, joint pain, joint swelling, muscle weakness, neck pain and stiffness. Negative for falls, gout, muscle cramps and myalgias.   Gastrointestinal: Positive for abdominal pain, flatus and nausea. Negative for heartburn, hematemesis, hematochezia and melena.   Genitourinary: Positive for nocturia.   Neurological: Positive for difficulty with concentration, dizziness, headaches, light-headedness, numbness, paresthesias and vertigo (positional). Negative for disturbances in coordination, excessive daytime sleepiness, focal weakness, loss of balance and weakness.   Psychiatric/Behavioral: Positive for depression. Negative for substance abuse. The patient has insomnia. The patient is not nervous/anxious.    Allergic/Immunologic: Positive for environmental allergies.        Objective:    Physical Exam   Constitutional: She is oriented to  "person, place, and time. She appears well-developed and well-nourished.   HENT:   Head: Normocephalic.   Eyes: Pupils are equal, round, and reactive to light. Conjunctivae and EOM are normal.   Neck: Normal range of motion. Neck supple. No JVD present. No thyromegaly present.   Circumference 12"   Cardiovascular: Normal rate, regular rhythm and intact distal pulses. Exam reveals distant heart sounds. Exam reveals no gallop and no friction rub.   No murmur heard.  Pulses:       Carotid pulses are 1+ on the right side and 1+ on the left side.       Radial pulses are 1+ on the right side and 1+ on the left side.        Femoral pulses are 1+ on the right side and 1+ on the left side.       Popliteal pulses are 1+ on the right side and 1+ on the left side.        Dorsalis pedis pulses are 1+ on the right side and 1+ on the left side.        Posterior tibial pulses are 1+ on the right side and 1+ on the left side.   Pulmonary/Chest: Effort normal and breath sounds normal. She has no rales. She exhibits no tenderness.   Abdominal: Soft. Bowel sounds are normal. There is no abdominal tenderness.   Waist 37.5", hip 43"   Musculoskeletal: Normal range of motion.         General: No edema.   Lymphadenopathy:     She has no cervical adenopathy.   Neurological: She is alert and oriented to person, place, and time.   Skin: Skin is warm and dry. No rash noted.         Assessment:       1. Stable angina pectoris    2. Essential hypertension, dx 2000    3. 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 Class 1 obesity due to excess calories with serious comorbidity and body mass index (BMI) of 31.0 to 31.9 in adult    5. Chest pressure, onset 2010    6. Mini stroke, 6-7, recent dx with MRI    7. Hypercholesterolemia, baseline     8. Cardiovascular event risk, ASCVD 10-yr risk 6%, 2020    9. Excessive daytime sleepiness    10. Abdominal obesity    11. Benign paroxysmal positional vertigo due to bilateral vestibular " disorder    12. Mild nonproliferative diabetic retinopathy of left eye without macular edema associated with type 1 diabetes mellitus         Plan:       Geovanna was seen today for establish care.    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, uncontrolled, managed as type 1    Class 1 obesity due to excess calories with serious comorbidity and body mass index (BMI) of 31.0 to 31.9 in adult    Chest pressure, onset 2010    Mini stroke, 6-7, recent dx with MRI    Hypercholesterolemia, baseline     Cardiovascular event risk, ASCVD 10-yr risk 6%, 2020    Excessive daytime sleepiness    Abdominal obesity    Benign paroxysmal positional vertigo due to bilateral vestibular disorder    Mild nonproliferative diabetic retinopathy of left eye without macular edema associated with type 1 diabetes mellitus    Other orders  -     Cancel: IN OFFICE EKG 12-LEAD (to Muse)    - All medical issues reviewed, continue current Rx, wants to continue on HCT due to ankle swelling  - Difficult case with likely small vessel disease, unsure if cause of chest pains. Best treatment is physical Rx  - Recommend cardiac rehab.  - Encourage 2 not fried fatty fish meal per week.   - CV status stable, all medications reviewed, patient acknowledge good understanding.  - Recommend healthy living: no nicotine, avoid 2nd hand smoke, moderate alcohol, healthy diet and regular exercise aiming for fitness, and weight control  - Need good exercise program, 4 key elements: 1. Aerobic (walking, swimming, dancing, jogging, biking, etc, 2. Muscle strengthening / resistance exercise, need to do 2-3 times weekly, 3. Stretching daily, good stretch takes a whole  total minute. 4. Balance exercise daily.   - Instruction for Mediterranean diet and heart healthy exercise given.  - Check home blood pressure, 2 days weekly, do 2 readings  within 5 minutes in AM and PM, keep log for review.  - Weigh twice weekly, try to lose 1-2 lbs per week. Target weight loss of 5%-10%.  - Highly recommend 30 minutes of exercise / activities daily, can have Sunday off, with 2-3 sessions of muscle strengthening weekly. A  would be very helpful.  - Recommend at least biannual cardiovascular evaluation in view of patient's significant risk factors. Patient's preference.  - Phone review / encourage use of [facility]      Greater than 50% of the time was spent in counseling and coordination of care. The above assessment and plan have been discussed at length. Referring provider's note reviewed. Labs and procedure over the last 6 months reviewed. Problem List updated. Asked to bring in all active medications / pills bottles with next visit.

## 2020-12-14 NOTE — PROGRESS NOTES
Chart was reviewed for overdue [facility] Encounters ([facility]NC[facility])  topics  Updates were requested from care everywhere  Health Maintenance has been updated  LIN[place] immunization registry triggered

## 2020-12-14 NOTE — PATIENT INSTRUCTIONS
Recommended Mediterranean dietEating Heart-Healthy Food: Using the DASH Plan  Eating for your heart doesnt have to be hard or boring. You just need to know how to make healthier choices. The DASH eating plan has been developed to help you do just that. DASH stands for Dietary Approaches to Stop Hypertension. It is a plan that has been proven to be healthier for your heart and to lower your risk for high blood pressure. It can also help lower your risk for cancer, heart disease, osteoporosis, and diabetes.  Choosing from Each Food Group  Choose foods from each of the food groups below each day. Try to get the recommended number of servings for each food group. The serving numbers are based on a diet of 2,000 calories a day. Talk to your doctor if youre unsure about your calorie needs.  Grains   Servings: 7-8 a day  A serving is:  · 1 slice bread  · 1 ounce dry cereal  · half a cup cooked rice or pasta  Best choices: Whole grains and any grains high in fiber.  Vegetables   Servings: 4-5 a day  A serving is:  · 1 cup raw leafy vegetable  · Half a cup cooked vegetable  · Three-quarter cup vegetable juice  Best choices: Fresh or frozen vegetable prepared without too much added salt or fat.    Fruits   Servings: 4-5 a day  A serving is:  · Three-quarter cup fruit juice  · 1 medium fruit  · One-quarter cup dried fruit  · One-half cup fresh, frozen, or canned fruit  Best choices: A variety of fresh fruits of different colors. Whole fruits are a much better choice than fruit juices.  Low-fat or Fat Free Dairy   Servings: 2-3 a day  A serving is:  · 8 ounces milk  · 1 cup yogurt  · One and a half ounces cheese  Best choices: Skim or 1% milk, low-fat or fat free yogurt or buttermilk, and low-fat cheeses.       Meat, Poultry, Fish   Servings: 2 or fewer a day  A serving is:  · 3 ounces cooked meat, poultry, or fish  Best choices: Lean meats and fish. Trim away visible fat. Broil, roast, or boil instead of frying. Remove skin  from poultry before eating.  Nuts, Seeds, Beans   Servings: 4-5 a week  A serving is:  · One third cup nuts (or one and a half ounces)  · 2 tablespoons sunflower seeds  · Half a cup cooked beans  Best choices: Dry roasted nuts with no salt added, lentils, kidney beans, garbanzo beans, and whole benitez beans.    Fats and Oils   Servings: 2 a day  A serving is:  · 1 teaspoon vegetable oil  · 1 teaspoon soft margarine  · 1 tablespoon low-fat mayonnaise  · 1 teaspoon regular mayonnaise  · 2 tablespoons light salad dressing  · 1 tablespoon regular salad dressing  Best choices: Monounsaturated and polyunsaturated fats such as olive, canola, or safflower oil.  Sweets   Servings: 5 a week or fewer  A serving is:  · 1 tablespoon sugar, maple syrup, or honey  · 1 tablespoon jam or jelly  · 1 half-ounce jelly beans (about 15)  · 8 ounces lemonade  Best choices: Dried fruit can be a satisfying sweet. Choose low-fat sweets when possible. And watch your serving sizes!       Aerobic Exercise for a Healthy Heart  Exercise is a lot more than an energy booster and a stress reliever. It also strengthens your heart muscle, lowers your blood pressure and blood cholesterol, and burns calories.      Remember, some activity is better than none.     Choose an Aerobic Activity  Choose a nonstop activity that makes your heart and lungs work harder than they do when you rest or walk normally. This aerobic exercise can improve the way your heart and other muscles use oxygen. Make it fun by exercising with a friend and choosing an activity you enjoy. Here are some ideas:  · Walking  · Swimming  · Bicycling  · Stair climbing  · Dancing  · Jogging  Exercise Regularly  If you havent been exercising regularly,  get your doctors okay first. Then start slowly.  Here are some tips:  · Begin exercising 3 times a week for 5-10 minutes at a time.  · When you feel comfortable, add a few minutes each week.  · Slowly build up to exercising 3-4 times each  week for 20-40 minutes. Aim for a total of 150 or more minutes a week.  · Be sure to carry your nitroglycerin with you when you exercise.  · If you get angina when youre exercising, stop what youre doing, take your nitroglycerin, and call your doctor.  © 6259-6677 [employer], [address] [place] [address], [place] [address]. All rights reserved. This information is not intended as a substitute for professional medical care. Always follow your healthcare professional's instructions.    Losing Weight (Cardiovascular)  Excess weight is a major risk factor for heart disease. Losing weight may help keep your arteries open so that your heart can get the oxygen-rich blood it needs. Weight loss can also help lower your blood pressure and reduce your risk for diabetes. All in all, losing weight makes you healthier.          Exercise with a friend. When activity is fun, you're more likely to stick with it.        Calories and Weight Loss  Calories are the fuel your body burns for energy. You get the calories you need from the food you eat. For healthy weight loss, women should eat at least 1,200 calories a day, men at least 1,500.    When you eat more calories than you need, your body stores the extra calories as fat. One pound of fat equals 3,500 calories.    To lose weight, try to burn 500 calories a day more than you eat. To do this, eat 250 calories less each day. Add activity to burn the other 250 calories. Walking 21/2 miles burns about 250 calories.    Eat a variety of healthy foods. Its the best way to make calories count.     Tips for losing weight:  Drink 8 to 10 glasses of water a day.    Dont skip meals. Instead, eat smaller portions.       Brisk Activity Is Best  Brisk activity gets your heart pumping faster. It makes your heart healthier. Its also the best way to burn calories. In fact, your body may keep burning calories for hours after you stop a brisk activity.    Begin by walking 10 minutes most  days.    Add more time and speed to your walk. Build up as you feel able.    Try to walk briskly at least 30 minutes most days. If needed, you can break this into 2 shorter sessions.     Check off the ideas below that you could try to make your day more active:    Take the stairs instead of the elevator.    Park your car farther away and walk.    Ride a bike to work or to the store.    Walk laps around the mall.    Discharge Instructions: Taking Your Blood Pressure  Blood pressure is the force of blood as it moves from the heart through the blood vessels. You can take your own blood pressure reading using a digital monitor. Take readings as often as your healthcare provider instructs. Take your readings each time in the same way, using the same arm. Here are guidelines for taking your blood pressure.  The American Heart Association (AHA) recommends purchasing a blood pressure monitor that is validated and approved by the Association for the Advancement of Medical Instrumentation, the [place] Hypertension Society, and the International Protocol for the Validation of Automated BP Measuring Devices. If the blood pressure monitor is for a senior adult, a pregnant woman, or a child, make certain it is validated for use with such a population. For the most reliable readings, the AHA recommends an automatic, cuff-style, upper arm (bicep) monitor. The readings from finger and wrist monitors are not as reliable as the upper arm monitor.        Step 1. Relax    · Wait at least a half hour after smoking, eating, or exercising. Do not drink coffee, tea, soda, or other caffeinated beverages before checking your blood pressure.   · Sit comfortably at a table. Place the monitor near you.  · Rest for a few minutes before you begin.        Step 2. Wrap the cuff    · Place your arm on the table, palm up. Put your arm in a position that is level with your heart. Wrap the cuff around your upper arm, about an inch above your  elbow. Its best to wrap the cuff on bare skin, not over clothing.  · Make sure your cuff fits. If it doesnt wrap around your upper arm, order a larger cuff. A cuff that is too large or too small can result in an inaccurate blood pressure reading.           Step 3. Inflate the cuff    · Pump the cuff until the scale reads 200. If you have a self-inflating cuff, push the button that starts the pump.  · The cuff will tighten, then loosen.  · The numbers will change. When they stop changing, your blood pressure reading will appear.  · If you get a reading that is too high or too low for you, relax for a few minutes. Then do the test again.    Step 4. Write down the results  · Write down your blood pressure numbers. Michael the date and time. Keep your results in one place, such as a notebook.  · Remove the cuff from your arm. Turn off the machine.  · Take the readings with you to your medical appointments.  · If you start a new blood pressure medicine, or change a blood pressure medicine dose, note the day you started the new drug or dosage on your blood pressure recording sheet. This will help your healthcare provider monitor the effect of medication changes.     Date Last Reviewed: 4/27/2016  © 7816-3720 [employer]. [address] [place] [address], [place] [address]. All rights reserved. This information is not intended as a substitute for professional medical care. Always follow your healthcare professional's instructions.        Discharge Instructions for Angina  You have been diagnosed with a type of chest pain called angina. Angina occurs when not enough oxygen reaches the heart muscle. It is most often felt under your breastbone, in your left shoulder, or down your left arm. The pain may even spread to your jaw or back. Exercise, increased activity, emotional upset, or stress can trigger this pain. With proper treatment and lifestyle changes to reduce risk factors, most people with angina are able to  maintain a full and active life.  Managing risk factors  Your healthcare provider will work with you to make lifestyle changes as needed. This can help prevent worsening of coronary artery disease, which is likely the cause of your angina.  Coronary artery disease is a narrowing of the blood vessels that supply oxygen and nutrients to the heart muscle. The blood vessels can also spasm and reduce the oxygen reaching the heart muscle from the narrowing inside of the artery. Managing your risk factors may prevent both of these causes of narrowing of your arteries.  Diet  Your healthcare provider will give you information on dietary changes that you may need to make, based on your situation. Your provider may recommend that you see a registered dietitian for help with diet changes. Try these changes to start:    · Reduce fat and cholesterol intake   · Reduce sodium (salt) intake, especially if you have high blood pressure   · Increase your intake of fresh vegetables and fruits   · Eat lean proteins, such as fish, poultry, and legumes (beans and peas) and eat less red meat and processed meats  · Use low-fat dairy products   · Use vegetable and nut oils in limited amounts   · Limit sweets and processed foods such as chips, cookies, and baked goods  · Limit sodas and high calorie drinks  · Limit greasy and fried foods, or those high in saturated fat  · Limit alcohol intake  Physical activity  Your healthcare provider may recommend that you increase your physical activity if you have not been as active as possible. This may include moderate to vigorous intensity physical activity for at least 30 to 60 minutes each day for at least 5 to 7 days per week. A few examples of moderate to vigorous intensity physical activity include:   · Walking at a brisk pace, about 3 to 4 miles per hour  · Jogging or running  · Swimming or water aerobics  · Hiking  · Dancing  · Martial arts  · Tennis  · Riding a bike  Don't start or increase  your activity level without first seeing your healthcare provider.  Weight management  If you are overweight, your healthcare provider will work with you to lose weight and lower your BMI (body mass index) to a normal or near-normal level. Making diet changes and increasing physical activity can help. A healthy and reasonable goal for weight loss is to lose 10% of your current weight per year.  Smoking  If you smoke, break the smoking habit. Enroll in a stop-smoking program to improve your chances of success. You can also join a support group. Talk to your healthcare provider about nicotine replacement products or medicines to help you quit.  Stress  Learn ways to manage stress to help you deal with stress in your home and work life. Your ability to prioritize your health depends on your mental health and focus. Feeling supported in the rest of your life is key to achieving success with your health.  Managing medicines  · Keep a record of your episodes of chest pain. Take these with you when you see your healthcare provider.  · Take your medicines exactly as directed. Dont skip doses. If you miss a dose, call your healthcare provider right away.  · If you have unwanted side effects from your medicine, tell your healthcare provider right away.  Taking nitroglycerin  · Keep your nitroglycerin with you at all times.  · If youre on nitroglycerin, dont take medicines used to treat erectile dysfunction (such as sildenafil) at all. These can react with nitroglycerin and cause your blood pressure to drop to a dangerous or even life-threatening level.  · If you use nitroglycerin to prevent angina attacks, follow your healthcare providers instructions for your kind of nitroglycerin (pill, spray, or skin patch).  · If you use nitroglycerin to stop an angina attack, follow these steps:  ¨ Sit down (you may become dizzy).  ¨ Place 1 tablet under your tongue, or between your lip and gum, or between your cheek and gum. Let the  tablet dissolve completely; do not chew or swallow the tablet.  ¨ If you use a spray, then spray once on or under your tongue. Do not inhale. Close your mouth. Wait a few seconds before you swallow and don't rinse your mouth for 5 to 10 minutes.  ¨ After taking 1 tablet or spraying once, continue sitting for 5 minutes.  ¨ If the angina goes away completely, rest awhile and follow your provider's orders about returning to your normal routine.  ¨ If the chest pain or pressure continues, CALL 911 immediately. Do NOT delay. You may be having a heart attack (acute myocardial infarction, or AMI)!  ¨ You may be told by your doctor to CALL 911 after taking 2 or 3 tables or sprays of nitroglycerin (spaced 5 minutes apart) and the chest pain or pressure is still present 5 minutes after the last dose. Do not take more than 3 tablets, or spray more than 3 times, within 15 minutes.   When to call your doctor  Call your doctor immediately if you have any of these:  · Severe headache  · Severe dizziness, or fainting  · Nausea or vomiting  · Fast heartbeat (higher than 100 beats per minute)  · Swollen ankles  · Weakness  · Angina attacks that last longer, occur more often, or are more severe than in the past  · Angina that occurs at rest, wakes you up out of sleep, or does not resolve   Date Last Reviewed: 6/1/2016  © 2987-0970 [employer]. [address] [place] [address]. All rights reserved. This information is not intended as a substitute for professional medical care. Always follow your healthcare professional's instructions.        What Is Angina?  Angina is a warning that your heart muscle is not getting enough oxygen-rich blood and is at risk for damage. Medicines, certain medical procedures, and lifestyle changes can help control angina. Talk with your healthcare provider about how to prevent angina and what to do if you get it.    How does angina feel?  Angina is often described as chest pain, but  this can be misleading. Angina is not always painful, and it isnt always felt in the chest. Angina might feel like:  · Discomfort, aching, tightness, or pressure that comes and goes. You may feel this in your chest, back, abdomen, arm, shoulder, neck, or jaw.  · Tiredness that gets worse or you have more tiredness than usual for no clear reason  · Shortness of breath while doing something that used to be easy  · Heartburn, indigestion, nausea, or sweating  Call 911 right away if any of your symptoms lasts for more than a few minutes. Or if they go away and come back. Or if they happen at rest and don't go away after taking nitroglycerin. Or if they continue to get worse. You could be having a heart attack (acute myocardial infarction).  When does angina happen?  · Angina usually happens during activity. It can also occur when youre upset or after a large meal. Sometimes angina can happen when the weather is too hot or too cold. All of these things can put more stress on your body and your heart.  · You may have unstable angina if angina starts occurring more often, lasts longer, happens even when you are resting, or causes more discomfort. Its a sign that your heart problem may be getting worse. You need to call your healthcare provider right away.  Date Last Reviewed: 10/1/2016  © 0382-3325 [employer]. [address] [place] [address]. All rights reserved. This information is not intended as a substitute for professional medical care. Always follow your healthcare professional's instructions.        Getting Started With Cardiac Rehab: Exercise  Being more active is a key part of heart attack prevention. It helps your heart muscle and the rest of your body get stronger. It also helps control other heart risks.    For lasting results, exercise needs to be a lifelong commitment.  Getting started  · Join a cardiac rehab program. This is one of the easiest ways to start exercising. When your  healthcare provider says its OK, you can start exercising on your own at home or at a gym. Gradually work toward the goal of exercising at least 150 minutes a week.  · Know that a managed plan of exercise will reduce your risk for another heart attack.  · Find activities you enjoy, from walking with a friend to playing tennis. If you do what you like, youll not only enjoy yourself, but youll also be more likely to stick with it.  Cardiac rehabilitation  In cardiac rehab, a team of providers creates an exercise plan for you and guides you through it. At first, the goal is to regain basic endurance and strength. Youll start with something simple, such as walking. Then youll be given exercises to help you further increase strength and endurance, as well as flexibility. The skills you learn in cardiac rehab can benefit you for the rest of your life.  Date Last Reviewed: 10/12/2015  © 7035-9637 [employer]. [address] [place] [address]. All rights reserved. This information is not intended as a substitute for professional medical care. Always follow your healthcare professional's instructions.        Cardiac Rehabilitation: Following an Exercise Program    A big part of a cardiac rehab program is exercise. Regular exercise will make your heart and coronary arteries healthier. It helps increase strength, lower blood pressure, relieve stress, and control your weight. Cardiac rehab lowers the risk for future hospital stays and worsening heart problems.  A supervised program  Your cardiac rehab exercise program may start right in the hospital. Your healthcare provider and a cardiac rehab technician will explain how it works. At first, the goal is to regain basic strength. You will start with light exercise, such as walking down the hospital bernard. After you leave the hospital, you may continue supervised exercise at a medical center or other facility. There, exercises will be prescribed to help  you build strength, flexibility, and endurance. This program will be designed to help you function independently and build your heart's strength.  Continuing at home  After you finish your supervised program, don't give up exercising. Keep exercising at the medical center, at home, or at a fitness center. By sticking with a program of exercise, youll lower your risk for future heart attack, (acute myocardial infarction, or AMI) and stroke. And youll feel and look better, too. To make exercise more fun, invite your family and friends to join you. Being active has benefits for everyone.  Aerobic exercise  Aerobic exercise helps your heart and other muscles better use oxygen. Many cardiac rehab programs use walking on a treadmill as a basic form of aerobic exercise. Some programs also use equipment, such as stationary bikes, recumbent cross-trainers that are low impact on joints, arm cranks, and light weights. You will be shown how to use them to get the most benefit. In most programs, your heart rate and blood pressure will be monitored while you exercise.  Signs of overexertion  Stop exercising and call your provider if you feel any of these symptoms:  · Chest pain or discomfort  · Burning, tightness, heaviness, or pressure in your chest  · Unusual aching in your arm, shoulders, neck, jaw, or back  · Trouble catching your breath  · Racing or skipping heart  · Extreme fatigue (especially after exercise)  · Lightheadedness, dizziness, or nausea   The signs of over-exertion can happen at any point in your recovery, whether you are at a cardiac rehab center or have continued your exercise program at home. The trained professionals in the cardiac rehab center will have you stop exercising if you have a dangerous drop or rise in your heart rate or blood pressure, low oxygen levels, or if you report symptoms to the cardiac rehab staff. Your healthcare provider will usually get a progress update from the cardiac rehab  center and this often includes a log of your vital signs and notes about each visit.  Physical stress after a heart attack or the side effects from heart medicines can have a negative effect on sexual activity. Cardiac rehab programs can help with both the physical and emotional aspects of heart disease that may affect your sexual health. Talk to your doctor about the risks of resuming sexual activity including when it will be safe for you to have sex again. Generally, it's safe to have sex after a heart attack or heart surgery once you can walk around a city block or up a flight of stairs without symptoms of chest pain, shortness of breath, or severe fatigue.  Make sure you take your medicines as they are prescribed before your cardiac rehab appointment. If you are prescribed oxygen, be sure to use it especially when you are exercising. Don't take the oxygen off because you feel like the tubing is getting in the way or the tanks are hard to walk around with, this could really stress your heart and lungs.  Remember to update your healthcare provider about your overall progress when you see him or her in your follow up appointments.  Date Last Reviewed: 12/1/2016  © 7079-1852 [employer]. [address] [place] [address]. All rights reserved. This information is not intended as a substitute for professional medical care. Always follow your healthcare professional's instructions.        Cardiac Rehabilitation  Cardiac rehabilitation (cardiac rehab) is a professionally supervised program designed by your healthcare team. It will help you recover from your heart problem and reduce your risk of future heart problems. Along with a tailored exercise program, cardiac rehab provides education and counseling to improve health.     Your cardiac rehab program may use treadmills, stationary bikes, or recumbent cross-trainers, to increase endurance. Arm cranks, weights, and wall pulleys may be used to  build upper body strength.   Your rehab program  Your cardiac rehab program may start while youre still in the hospital. After you leave the hospital, you may go to a facility for rehab classes. Youll regain some strength and learn how to exercise safely. Once you do that, your healthcare provider may prescribe an exercise program for you to do at a gym or at home.  As an inpatient  You may start light exercise within 2 days of entering the hospital once you receive the healthcare provider's approval. Your activity may be limited based on the procedure you had such as bypass surgery, valve replacement, coronary angioplasty, or coronary stenting.  As an outpatient  As early as 1 to 2 weeks after leaving the hospital, you can join a supervised rehab program. Ask for contact and enrollment information before you leave the hospital. See if your healthcare team can get an appointment set up before you are discharged home.  · Exercises will be prescribed to help you build strength and movement. The first month will most likely include easier exercises. Over time, youll exercise harder to improve your endurance.  · Your heart, oxygen saturation, and blood pressure may be monitored as you work.  · Cardiac rehab programs are tailored to meet your needs. Some people may participate in the program for 6 weeks while others will participate 6 months or longer.  Maintain the benefits to your health  Dont stop once youve finished your program! Make what you learned in rehab a regular part of your life. Here are some tips:  · Work out at home or at a gym. Try watching a new workout video each week. Take an exercise class. Find something that keeps you interested.  · Ask family and friends to help you stay motivated. The healthy lifestyle changes can benefit them as well by partnering up and working out together.  · Make other lifestyle changes to improve your heart and overall health. Quit smoking. Make changes to lower your  stress. Lose excess weight. And lower your blood pressure and cholesterol.  · It's important to keep an open conversation with your healthcare provider about your progress and goals.  Date Last Reviewed: 12/1/2016  © 1830-7960 [employer]. [address] [place] [address], [place] [address]. All rights reserved. This information is not intended as a substitute for professional medical care. Always follow your healthcare professional's instructions.

## 2020-12-17 NOTE — PROGRESS NOTES
DIABETES EDUCATOR NOTE   Return of the Dexcom G6 Pro Sensor and Patient Log.     Patient returned to clinic today to return Glucose Sensor and signed patient log form used in [facility] procedure.     The CGMS Sensor will be scanned and downloaded. All reports will be imported into the patient's electronic medical record.     Endocrine Provider will complete data interpretation and make recommendations; will forward recommendations to the ordering provider for follow up with patient.

## 2020-12-18 ENCOUNTER — PATIENT MESSAGE (OUTPATIENT)
Dept: NEUROLOGY | Facility: CLINIC | Age: 55
End: 2020-12-18

## 2020-12-22 ENCOUNTER — PATIENT MESSAGE (OUTPATIENT)
Dept: FAMILY MEDICINE | Facility: CLINIC | Age: 55
End: 2020-12-22

## 2020-12-22 DIAGNOSIS — G44.209 MUSCLE TENSION HEADACHE: Primary | ICD-10-CM

## 2020-12-22 DIAGNOSIS — M79.7 FIBROMYALGIA SYNDROME: ICD-10-CM

## 2021-01-02 ENCOUNTER — PATIENT MESSAGE (OUTPATIENT)
Dept: FAMILY MEDICINE | Facility: CLINIC | Age: 56
End: 2021-01-02

## 2021-01-04 ENCOUNTER — PATIENT MESSAGE (OUTPATIENT)
Dept: ADMINISTRATIVE | Facility: HOSPITAL | Age: 56
End: 2021-01-04

## 2021-01-04 PROBLEM — Z00.00 ADULT GENERAL MEDICAL EXAM: Status: RESOLVED | Noted: 2020-10-01 | Resolved: 2021-01-04

## 2021-01-08 ENCOUNTER — PATIENT MESSAGE (OUTPATIENT)
Dept: FAMILY MEDICINE | Facility: CLINIC | Age: 56
End: 2021-01-08

## 2021-01-11 ENCOUNTER — LAB VISIT (OUTPATIENT)
Dept: FAMILY MEDICINE | Facility: CLINIC | Age: 56
End: 2021-01-11
Payer: COMMERCIAL

## 2021-01-11 DIAGNOSIS — R09.89 RESPIRATORY SYMPTOMS: ICD-10-CM

## 2021-01-11 PROCEDURE — U0003 INFECTIOUS AGENT DETECTION BY NUCLEIC ACID (DNA OR RNA); SEVERE ACUTE RESPIRATORY SYNDROME CORONAVIRUS 2 (SARS-COV-2) (CORONAVIRUS DISEASE [COVID-19]), AMPLIFIED PROBE TECHNIQUE, MAKING USE OF HIGH THROUGHPUT TECHNOLOGIES AS DESCRIBED BY CMS-2020-01-R: HCPCS

## 2021-01-12 ENCOUNTER — TELEPHONE (OUTPATIENT)
Dept: FAMILY MEDICINE | Facility: CLINIC | Age: 56
End: 2021-01-12

## 2021-01-12 DIAGNOSIS — U07.1 COVID-19 VIRUS DETECTED: ICD-10-CM

## 2021-01-12 LAB — SARS-COV-2 RNA RESP QL NAA+PROBE: DETECTED

## 2021-01-14 ENCOUNTER — PATIENT MESSAGE (OUTPATIENT)
Dept: FAMILY MEDICINE | Facility: CLINIC | Age: 56
End: 2021-01-14

## 2021-01-20 ENCOUNTER — PATIENT MESSAGE (OUTPATIENT)
Dept: FAMILY MEDICINE | Facility: CLINIC | Age: 56
End: 2021-01-20

## 2021-01-20 ENCOUNTER — TELEPHONE (OUTPATIENT)
Dept: FAMILY MEDICINE | Facility: CLINIC | Age: 56
End: 2021-01-20

## 2021-02-03 ENCOUNTER — PATIENT MESSAGE (OUTPATIENT)
Dept: ADMINISTRATIVE | Facility: HOSPITAL | Age: 56
End: 2021-02-03

## 2021-03-04 DIAGNOSIS — Z11.59 NEED FOR HEPATITIS C SCREENING TEST: ICD-10-CM

## 2021-04-07 ENCOUNTER — PATIENT MESSAGE (OUTPATIENT)
Dept: ADMINISTRATIVE | Facility: HOSPITAL | Age: 56
End: 2021-04-07

## 2021-05-18 ENCOUNTER — PATIENT MESSAGE (OUTPATIENT)
Dept: ADMINISTRATIVE | Facility: HOSPITAL | Age: 56
End: 2021-05-18

## 2021-08-04 ENCOUNTER — PATIENT MESSAGE (OUTPATIENT)
Dept: ADMINISTRATIVE | Facility: HOSPITAL | Age: 56
End: 2021-08-04

## 2021-08-23 ENCOUNTER — PATIENT MESSAGE (OUTPATIENT)
Dept: ADMINISTRATIVE | Facility: HOSPITAL | Age: 56
End: 2021-08-23

## 2021-10-14 DIAGNOSIS — Z12.11 COLON CANCER SCREENING: ICD-10-CM

## 2021-10-21 ENCOUNTER — TELEPHONE (OUTPATIENT)
Dept: FAMILY MEDICINE | Facility: CLINIC | Age: 56
End: 2021-10-21

## 2021-11-03 ENCOUNTER — PATIENT MESSAGE (OUTPATIENT)
Dept: ADMINISTRATIVE | Facility: HOSPITAL | Age: 56
End: 2021-11-03
Payer: COMMERCIAL

## 2021-12-22 DIAGNOSIS — Z12.31 OTHER SCREENING MAMMOGRAM: ICD-10-CM

## 2022-01-10 ENCOUNTER — PATIENT MESSAGE (OUTPATIENT)
Dept: ADMINISTRATIVE | Facility: HOSPITAL | Age: 57
End: 2022-01-10
Payer: COMMERCIAL

## 2022-01-11 ENCOUNTER — PATIENT MESSAGE (OUTPATIENT)
Dept: ADMINISTRATIVE | Facility: HOSPITAL | Age: 57
End: 2022-01-11
Payer: COMMERCIAL

## 2022-03-04 ENCOUNTER — TELEPHONE (OUTPATIENT)
Dept: FAMILY MEDICINE | Facility: CLINIC | Age: 57
End: 2022-03-04
Payer: COMMERCIAL

## 2022-03-04 NOTE — TELEPHONE ENCOUNTER
Attempt to reach patient per telephone regarding elevated B/P at last visit with PCP, no answer. STEPH

## 2022-03-10 DIAGNOSIS — E11.319 DIABETIC RETINOPATHY: ICD-10-CM

## 2022-03-29 ENCOUNTER — TELEPHONE (OUTPATIENT)
Dept: PRIMARY CARE CLINIC | Facility: CLINIC | Age: 57
End: 2022-03-29
Payer: COMMERCIAL

## 2022-03-29 NOTE — TELEPHONE ENCOUNTER
Called pt to schedule for mammo, spoke to pt , stated he will give the message to the pt and will call back .phone # provided..[phone]..

## 2022-04-04 ENCOUNTER — PATIENT MESSAGE (OUTPATIENT)
Dept: ADMINISTRATIVE | Facility: HOSPITAL | Age: 57
End: 2022-04-04
Payer: COMMERCIAL

## 2022-05-11 DIAGNOSIS — Z11.59 NEED FOR HEPATITIS C SCREENING TEST: ICD-10-CM

## 2022-05-31 ENCOUNTER — PATIENT MESSAGE (OUTPATIENT)
Dept: ADMINISTRATIVE | Facility: HOSPITAL | Age: 57
End: 2022-05-31
Payer: COMMERCIAL

## 2022-06-07 ENCOUNTER — PATIENT OUTREACH (OUTPATIENT)
Dept: ADMINISTRATIVE | Facility: HOSPITAL | Age: 57
End: 2022-06-07
Payer: COMMERCIAL

## 2022-06-17 ENCOUNTER — TELEPHONE (OUTPATIENT)
Dept: FAMILY MEDICINE | Facility: CLINIC | Age: 57
End: 2022-06-17
Payer: COMMERCIAL

## 2022-07-28 ENCOUNTER — PATIENT OUTREACH (OUTPATIENT)
Dept: ADMINISTRATIVE | Facility: HOSPITAL | Age: 57
End: 2022-07-28
Payer: COMMERCIAL

## 2022-07-28 ENCOUNTER — PATIENT MESSAGE (OUTPATIENT)
Dept: ADMINISTRATIVE | Facility: HOSPITAL | Age: 57
End: 2022-07-28
Payer: COMMERCIAL

## 2022-08-16 ENCOUNTER — TELEPHONE (OUTPATIENT)
Dept: PRIMARY CARE CLINIC | Facility: CLINIC | Age: 57
End: 2022-08-16
Payer: COMMERCIAL